# Patient Record
Sex: MALE | Race: WHITE | NOT HISPANIC OR LATINO | Employment: FULL TIME | URBAN - NONMETROPOLITAN AREA
[De-identification: names, ages, dates, MRNs, and addresses within clinical notes are randomized per-mention and may not be internally consistent; named-entity substitution may affect disease eponyms.]

---

## 2018-01-09 ENCOUNTER — OFFICE VISIT CONVERTED (OUTPATIENT)
Dept: FAMILY MEDICINE CLINIC | Age: 43
End: 2018-01-09
Attending: NURSE PRACTITIONER

## 2018-03-19 ENCOUNTER — OFFICE VISIT CONVERTED (OUTPATIENT)
Dept: FAMILY MEDICINE CLINIC | Age: 43
End: 2018-03-19
Attending: NURSE PRACTITIONER

## 2018-06-29 ENCOUNTER — OFFICE VISIT CONVERTED (OUTPATIENT)
Dept: FAMILY MEDICINE CLINIC | Age: 43
End: 2018-06-29
Attending: NURSE PRACTITIONER

## 2018-10-16 ENCOUNTER — OFFICE VISIT CONVERTED (OUTPATIENT)
Dept: FAMILY MEDICINE CLINIC | Age: 43
End: 2018-10-16
Attending: NURSE PRACTITIONER

## 2019-02-12 ENCOUNTER — OFFICE VISIT CONVERTED (OUTPATIENT)
Dept: FAMILY MEDICINE CLINIC | Age: 44
End: 2019-02-12
Attending: NURSE PRACTITIONER

## 2019-02-13 ENCOUNTER — HOSPITAL ENCOUNTER (OUTPATIENT)
Dept: OTHER | Facility: HOSPITAL | Age: 44
Discharge: HOME | End: 2019-02-13

## 2019-02-13 LAB
25(OH)D3 SERPL-MCNC: 17.6 NG/ML (ref 30–100)
ALBUMIN SERPL-MCNC: 4.7 G/DL (ref 3.5–5)
ALBUMIN/GLOB SERPL: 1.7 {RATIO} (ref 1.4–2.6)
ALP SERPL-CCNC: 53 U/L (ref 53–128)
ALT SERPL-CCNC: 41 U/L (ref 10–40)
ANION GAP SERPL CALC-SCNC: 18 MMOL/L (ref 8–19)
AST SERPL-CCNC: 23 U/L (ref 15–50)
BASOPHILS # BLD MANUAL: 0.04 10*3/UL (ref 0–0.2)
BASOPHILS NFR BLD MANUAL: 0.4 % (ref 0–3)
BILIRUB SERPL-MCNC: <0.15 MG/DL (ref 0.2–1.3)
BUN SERPL-MCNC: 18 MG/DL (ref 5–25)
BUN/CREAT SERPL: 18 {RATIO} (ref 6–20)
CALCIUM SERPL-MCNC: 9.6 MG/DL (ref 8.7–10.4)
CHLORIDE SERPL-SCNC: 101 MMOL/L (ref 99–111)
CHOLEST SERPL-MCNC: 227 MG/DL (ref 107–200)
CHOLEST/HDLC SERPL: 3.9 {RATIO} (ref 3–6)
CONV CO2: 29 MMOL/L (ref 22–32)
CONV TOTAL PROTEIN: 7.4 G/DL (ref 6.3–8.2)
CREAT UR-MCNC: 0.98 MG/DL (ref 0.7–1.2)
DEPRECATED RDW RBC AUTO: 47.4 FL
EOSINOPHIL # BLD MANUAL: 0.12 10*3/UL (ref 0–0.7)
EOSINOPHIL NFR BLD MANUAL: 1.3 % (ref 0–7)
ERYTHROCYTE [DISTWIDTH] IN BLOOD BY AUTOMATED COUNT: 13.7 % (ref 11.5–14.5)
GFR SERPLBLD BASED ON 1.73 SQ M-ARVRAT: >60 ML/MIN/{1.73_M2}
GLOBULIN UR ELPH-MCNC: 2.7 G/DL (ref 2–3.5)
GLUCOSE SERPL-MCNC: 90 MG/DL (ref 70–99)
GRANS (ABSOLUTE): 5.3 10*3/UL (ref 2–8)
GRANS: 59.1 % (ref 30–85)
HBA1C MFR BLD: 14.7 G/DL (ref 14–18)
HCT VFR BLD AUTO: 44.7 % (ref 42–52)
HDLC SERPL-MCNC: 58 MG/DL (ref 40–60)
IMM GRANULOCYTES # BLD: 0.03 10*3/UL (ref 0–0.54)
IMM GRANULOCYTES NFR BLD: 0.3 % (ref 0–0.43)
LDLC SERPL CALC-MCNC: 133 MG/DL (ref 70–100)
LYMPHOCYTES # BLD MANUAL: 2.49 10*3/UL (ref 1–5)
LYMPHOCYTES NFR BLD MANUAL: 11.2 % (ref 3–10)
MCH RBC QN AUTO: 30.9 PG (ref 27–31)
MCHC RBC AUTO-ENTMCNC: 32.9 G/DL (ref 33–37)
MCV RBC AUTO: 93.9 FL (ref 80–96)
MONOCYTES # BLD AUTO: 1.01 10*3/UL (ref 0.2–1.2)
OSMOLALITY SERPL CALC.SUM OF ELEC: 297 MOSM/KG (ref 273–304)
PLATELET # BLD AUTO: 299 10*3/UL (ref 130–400)
PMV BLD AUTO: 10.1 FL (ref 7.4–10.4)
POTASSIUM SERPL-SCNC: 4.5 MMOL/L (ref 3.5–5.3)
RBC # BLD AUTO: 4.76 10*6/UL (ref 4.7–6.1)
SODIUM SERPL-SCNC: 143 MMOL/L (ref 135–147)
TRIGL SERPL-MCNC: 180 MG/DL (ref 40–150)
TSH SERPL-ACNC: 2.49 M[IU]/L (ref 0.27–4.2)
VARIANT LYMPHS NFR BLD MANUAL: 27.7 % (ref 20–45)
VIT B12 SERPL-MCNC: 482 PG/ML (ref 211–911)
VLDLC SERPL-MCNC: 36 MG/DL (ref 5–37)
WBC # BLD AUTO: 8.99 10*3/UL (ref 4.8–10.8)

## 2019-02-15 LAB
SHBG SERPL-SCNC: 27.6 NMOL/L (ref 16.5–55.9)
TESTOST SERPL-MCNC: 257 NG/DL (ref 264–916)
TESTOSTERONE, FREE: 12.8 PG/ML (ref 6.8–21.5)

## 2019-08-09 ENCOUNTER — OFFICE VISIT CONVERTED (OUTPATIENT)
Dept: FAMILY MEDICINE CLINIC | Age: 44
End: 2019-08-09
Attending: NURSE PRACTITIONER

## 2019-09-24 ENCOUNTER — OFFICE VISIT CONVERTED (OUTPATIENT)
Dept: FAMILY MEDICINE CLINIC | Age: 44
End: 2019-09-24
Attending: NURSE PRACTITIONER

## 2019-10-18 ENCOUNTER — OFFICE VISIT CONVERTED (OUTPATIENT)
Dept: FAMILY MEDICINE CLINIC | Age: 44
End: 2019-10-18
Attending: NURSE PRACTITIONER

## 2019-10-29 ENCOUNTER — HOSPITAL ENCOUNTER (OUTPATIENT)
Dept: PHYSICAL THERAPY | Facility: CLINIC | Age: 44
Setting detail: RECURRING SERIES
Discharge: HOME | End: 2020-01-06
Attending: NURSE PRACTITIONER

## 2019-12-18 ENCOUNTER — OFFICE VISIT CONVERTED (OUTPATIENT)
Dept: FAMILY MEDICINE CLINIC | Age: 44
End: 2019-12-18
Attending: NURSE PRACTITIONER

## 2020-02-27 ENCOUNTER — HOSPITAL ENCOUNTER (OUTPATIENT)
Dept: OTHER | Facility: HOSPITAL | Age: 45
Discharge: HOME | End: 2020-02-27
Attending: NURSE PRACTITIONER

## 2020-02-27 LAB
ALBUMIN SERPL-MCNC: 4.7 G/DL (ref 3.5–5)
ALBUMIN/GLOB SERPL: 2 {RATIO} (ref 1.4–2.6)
ALP SERPL-CCNC: 55 U/L (ref 53–128)
ALT SERPL-CCNC: 39 U/L (ref 10–40)
ANION GAP SERPL CALC-SCNC: 20 MMOL/L (ref 8–19)
AST SERPL-CCNC: 21 U/L (ref 15–50)
BASOPHILS # BLD MANUAL: 0.02 10*3/UL (ref 0–0.2)
BASOPHILS NFR BLD MANUAL: 0.3 % (ref 0–3)
BILIRUB SERPL-MCNC: <0.15 MG/DL (ref 0.2–1.3)
BUN SERPL-MCNC: 15 MG/DL (ref 5–25)
BUN/CREAT SERPL: 15 {RATIO} (ref 6–20)
CALCIUM SERPL-MCNC: 9.1 MG/DL (ref 8.7–10.4)
CHLORIDE SERPL-SCNC: 104 MMOL/L (ref 99–111)
CHOLEST SERPL-MCNC: 201 MG/DL (ref 107–200)
CHOLEST/HDLC SERPL: 4 {RATIO} (ref 3–6)
CONV CO2: 21 MMOL/L (ref 22–32)
CONV TOTAL PROTEIN: 7 G/DL (ref 6.3–8.2)
CREAT UR-MCNC: 1.02 MG/DL (ref 0.7–1.2)
DEPRECATED RDW RBC AUTO: 45.6 FL
EOSINOPHIL # BLD MANUAL: 0.09 10*3/UL (ref 0–0.7)
EOSINOPHIL NFR BLD MANUAL: 1.4 % (ref 0–7)
ERYTHROCYTE [DISTWIDTH] IN BLOOD BY AUTOMATED COUNT: 13.5 % (ref 11.5–14.5)
GFR SERPLBLD BASED ON 1.73 SQ M-ARVRAT: >60 ML/MIN/{1.73_M2}
GLOBULIN UR ELPH-MCNC: 2.3 G/DL (ref 2–3.5)
GLUCOSE SERPL-MCNC: 85 MG/DL (ref 70–99)
GRANS (ABSOLUTE): 3.08 10*3/UL (ref 2–8)
GRANS: 49 % (ref 30–85)
HBA1C MFR BLD: 15.1 G/DL (ref 14–18)
HCT VFR BLD AUTO: 45 % (ref 42–52)
HDLC SERPL-MCNC: 50 MG/DL (ref 40–60)
IMM GRANULOCYTES # BLD: 0.01 10*3/UL (ref 0–0.54)
IMM GRANULOCYTES NFR BLD: 0.2 % (ref 0–0.43)
LDLC SERPL CALC-MCNC: 118 MG/DL (ref 70–100)
LYMPHOCYTES # BLD MANUAL: 2.26 10*3/UL (ref 1–5)
LYMPHOCYTES NFR BLD MANUAL: 13.2 % (ref 3–10)
MCH RBC QN AUTO: 30.7 PG (ref 27–31)
MCHC RBC AUTO-ENTMCNC: 33.6 G/DL (ref 33–37)
MCV RBC AUTO: 91.5 FL (ref 80–96)
MONOCYTES # BLD AUTO: 0.83 10*3/UL (ref 0.2–1.2)
OSMOLALITY SERPL CALC.SUM OF ELEC: 292 MOSM/KG (ref 273–304)
PLATELET # BLD AUTO: 322 10*3/UL (ref 130–400)
PMV BLD AUTO: 10 FL (ref 7.4–10.4)
POTASSIUM SERPL-SCNC: 3.9 MMOL/L (ref 3.5–5.3)
RBC # BLD AUTO: 4.92 10*6/UL (ref 4.7–6.1)
SODIUM SERPL-SCNC: 141 MMOL/L (ref 135–147)
TRIGL SERPL-MCNC: 506 MG/DL (ref 40–150)
TSH SERPL-ACNC: 2.27 M[IU]/L (ref 0.27–4.2)
VARIANT LYMPHS NFR BLD MANUAL: 35.9 % (ref 20–45)
WBC # BLD AUTO: 6.29 10*3/UL (ref 4.8–10.8)

## 2020-03-03 ENCOUNTER — OFFICE VISIT CONVERTED (OUTPATIENT)
Dept: FAMILY MEDICINE CLINIC | Age: 45
End: 2020-03-03
Attending: NURSE PRACTITIONER

## 2020-10-19 ENCOUNTER — OFFICE VISIT CONVERTED (OUTPATIENT)
Dept: FAMILY MEDICINE CLINIC | Age: 45
End: 2020-10-19
Attending: NURSE PRACTITIONER

## 2020-10-22 ENCOUNTER — HOSPITAL ENCOUNTER (OUTPATIENT)
Dept: OTHER | Facility: HOSPITAL | Age: 45
Discharge: HOME | End: 2020-10-22
Attending: NURSE PRACTITIONER

## 2020-12-17 ENCOUNTER — HOSPITAL ENCOUNTER (OUTPATIENT)
Dept: PHYSICAL THERAPY | Facility: CLINIC | Age: 45
Setting detail: RECURRING SERIES
Discharge: HOME | End: 2021-02-19
Attending: NURSE PRACTITIONER

## 2020-12-21 ENCOUNTER — HOSPITAL ENCOUNTER (OUTPATIENT)
Dept: OTHER | Facility: HOSPITAL | Age: 45
Discharge: HOME | End: 2020-12-21
Attending: NURSE PRACTITIONER

## 2020-12-21 LAB
ALBUMIN SERPL-MCNC: 4.7 G/DL (ref 3.5–5)
ALBUMIN/GLOB SERPL: 1.9 {RATIO} (ref 1.4–2.6)
ALP SERPL-CCNC: 45 U/L (ref 53–128)
ALT SERPL-CCNC: 35 U/L (ref 10–40)
ANION GAP SERPL CALC-SCNC: 14 MMOL/L (ref 8–19)
AST SERPL-CCNC: 26 U/L (ref 15–50)
BILIRUB SERPL-MCNC: 0.18 MG/DL (ref 0.2–1.3)
BUN SERPL-MCNC: 16 MG/DL (ref 5–25)
BUN/CREAT SERPL: 13 {RATIO} (ref 6–20)
CALCIUM SERPL-MCNC: 9.4 MG/DL (ref 8.7–10.4)
CHLORIDE SERPL-SCNC: 106 MMOL/L (ref 99–111)
CHOLEST SERPL-MCNC: 189 MG/DL (ref 107–200)
CHOLEST/HDLC SERPL: 3.3 {RATIO} (ref 3–6)
CONV CO2: 25 MMOL/L (ref 22–32)
CONV TOTAL PROTEIN: 7.2 G/DL (ref 6.3–8.2)
CREAT UR-MCNC: 1.27 MG/DL (ref 0.7–1.2)
GFR SERPLBLD BASED ON 1.73 SQ M-ARVRAT: >60 ML/MIN/{1.73_M2}
GLOBULIN UR ELPH-MCNC: 2.5 G/DL (ref 2–3.5)
GLUCOSE SERPL-MCNC: 92 MG/DL (ref 70–99)
HDLC SERPL-MCNC: 57 MG/DL (ref 40–60)
LDLC SERPL CALC-MCNC: 100 MG/DL (ref 70–100)
OSMOLALITY SERPL CALC.SUM OF ELEC: 293 MOSM/KG (ref 273–304)
POTASSIUM SERPL-SCNC: 4.2 MMOL/L (ref 3.5–5.3)
SODIUM SERPL-SCNC: 141 MMOL/L (ref 135–147)
TRIGL SERPL-MCNC: 159 MG/DL (ref 40–150)
VLDLC SERPL-MCNC: 32 MG/DL (ref 5–37)

## 2021-02-12 ENCOUNTER — OFFICE VISIT CONVERTED (OUTPATIENT)
Dept: FAMILY MEDICINE CLINIC | Age: 46
End: 2021-02-12
Attending: NURSE PRACTITIONER

## 2021-03-04 ENCOUNTER — HOSPITAL ENCOUNTER (OUTPATIENT)
Dept: OTHER | Facility: HOSPITAL | Age: 46
Discharge: HOME | End: 2021-03-04
Attending: NURSE PRACTITIONER

## 2021-05-18 NOTE — PROGRESS NOTES
Chase Leon 1975     Office/Outpatient Visit    Visit Date:  08:54 am    Provider: Marj Bhatia N.P. (Assistant: Flaquita Romero)    Location: [facility]        Electronically signed by Marj Bhatia N.P. on  2018 12:57:12 AM                             SUBJECTIVE:        CC:     Mr. Leon is a 42 year old White male.  med refills, would like to restart a previous medication         HPI:         Patient presents with anxiety.  Pt currently on lexapro and doing well.          Pt states previous history of alcoholism. He has been in programs and taken meds in the past to help him with his addiction. States having a counselor in the past. He does not feel like he needs that right now. He states drinking more in the summer bc he's bored and it's hot. He knows he needs to quit drinking and that it has become a problem for him.      ROS:     CONSTITUTIONAL:  Negative for chills, fatigue, fever and weight change.      CARDIOVASCULAR:  Negative for chest pain, orthopnea, paroxysmal nocturnal dyspnea and pedal edema.      RESPIRATORY:  Negative for dyspnea and cough.      GASTROINTESTINAL:  Negative for abdominal pain, heartburn, constipation, diarrhea, and stool changes.      PSYCHIATRIC:  Negative for anxiety and depression.          PMH/FMH/SH:     Last Reviewed on 2018 09:23 AM by Marj Bhatia    Past Medical History:             PAST MEDICAL HISTORY     UNREMARKABLE         Surgical History:         Vasectomy: 2006;         Family History:     Father: Suicide      Mother: Healthy     Brother(s): 0 brother(s) total     Sister(s): Healthy; 1 sister(s) total     Son(s): Healthy; 1 son(s) total     Daughter(s): Healthy; 1 daughter(s) total     Paternal Grandfather: ;  CVA     Paternal Grandmother: ;  CVA     Maternal Grandfather: ;  Myocardial Infarction     Maternal Grandmother: Alzheimer's Disease         Tobacco/Alcohol/Supplements:      Last Reviewed on 6/29/2018 09:23 AM by Marj Bhatia    Tobacco: Current Smoker: He currently smokes every day, Cigars.          Alcohol: Frequency: Daily When he drinks, the average quantity of alcohol is 8 drinks.   He typically consumes beer.          Substance Abuse History:     Last Reviewed on 6/29/2018 09:23 AM by Marj Bhatia    None         Mental Health History:     Last Reviewed on 6/29/2018 09:23 AM by Marj Bhatia        Communicable Diseases (eg STDs):     Last Reviewed on 6/29/2018 09:23 AM by Marj Bhatia            Current Problems:     Last Reviewed on 6/29/2018 09:23 AM by Marj Bhatia    Anxiety     Hypertension         Immunizations:     None        Allergies:     Last Reviewed on 6/29/2018 09:23 AM by Marj Bhatia      No Known Drug Allergies.         Current Medications:     Last Reviewed on 6/29/2018 09:23 AM by Marj Bhatia    Lexapro 10mg Tablet 1 tab daily     Lisinopril/Hydrochlorothiazide 20mg/25mg Tablet one a day         OBJECTIVE:        Vitals:         Current: 6/29/2018 8:56:26 AM    Ht:  5 ft, 10 in;  Wt: 201.9 lbs;  BMI: 29.0    T: 97.8 F (oral);  BP: 138/95 mm Hg (left arm, sitting);  P: 85 bpm (left arm (BP Cuff), sitting);  sCr: 0.94 mg/dL;  GFR: 104.64        Exams:     PHYSICAL EXAM:     GENERAL: Vitals recorded well developed, well nourished;  well groomed;  no apparent distress;     EYES: lids and lacrimal system are normal in appearance; extraocular movements intact; conjunctiva and cornea are normal; PERRLA;     NECK:  supple, full ROM; no thyromegaly; no carotid bruits;     RESPIRATORY: normal respiratory rate and pattern with no distress; normal breath sounds with no rales, rhonchi, wheezes or rubs;     CARDIOVASCULAR: normal rate; rhythm is regular;  normal S1; normal S2; no systolic murmur; no cyanosis; no edema;     GASTROINTESTINAL: nontender, nondistended; no hepatosplenomegaly or masses; no bruits;     SKIN:  no significant  rashes or lesions; no suspicious moles;     MUSCULOSKELETAL:  Normal range of motion, strength and tone;     NEUROLOGICAL:  cranial nerves, motor and sensory function, reflexes, gait and coordination are all intact;     PSYCHIATRIC:  appropriate affect and demeanor; normal speech pattern; grossly normal memory;         ASSESSMENT:           300.02   F41.1  Anxiety              DDx:     305.00   F10.10  Alcohol abuse, unspecified              DDx:         ORDERS:         Meds Prescribed:       Antabuse (Disulfiram)  500mg Tablet Take 1 tablet(s) by mouth daily for 14 days  #14 (Fourteen) tablet(s) Refills: 1         Lab Orders:       APPTO  Appointment need  (In-House)                   PLAN:          Anxiety         FOLLOW-UP: Schedule a follow-up visit in 3 months..            Orders:       APPTO  Appointment need  (In-House)            Alcohol abuse, unspecified Pt to call if counseling is needed.           Prescriptions:       Antabuse (Disulfiram)  500mg Tablet Take 1 tablet(s) by mouth daily for 14 days  #14 (Fourteen) tablet(s) Refills: 1             Patient Recommendations:        For  Anxiety:     Schedule a follow-up visit in 3 months.                APPOINTMENT INFORMATION:        Monday Tuesday Wednesday Thursday Friday Saturday Sunday            Time:___________________AM  PM   Date:_____________________             CHARGE CAPTURE:           Primary Diagnosis:     300.02 Anxiety            F41.1    Generalized anxiety disorder              Orders:          67710   Office/outpatient visit; established patient, level 3  (In-House)             APPTO   Appointment need  (In-House)           305.00 Alcohol abuse, unspecified            F10.10    Alcohol abuse, uncomplicated

## 2021-05-18 NOTE — PROGRESS NOTES
Chase Leon  1975     Office/Outpatient Visit    Visit Date: Wed, Dec 18, 2019 08:35 am    Provider: Sammy Denton N.P. (Assistant: Christine Espinal MA)    Location: [facility]        Electronically signed by Sammy Denton N.P. on  12/18/2019 12:36:08 PM                             Subjective:        CC: Mr. Leon is a 44 year old White male.  This is a follow-up visit.  check up         HPI:           Mr. Leon presents with essential (primary) hypertension.  This was first diagnosed over 2 yrs ago years ago.  He is not using any nonpharmacologic treatment modalities.  His current cardiac medication regimen includes a diuretic and an ACE inhibitor.  Compliance with treatment has been good; he takes his medication as directed and follows up as directed.  He is tolerating the medication well without side effects.  Review of his blood pressure log reveals systolics in the 140-160 range and diastolics in the 80-90 range.  BP running too high at home, occasional normal BP     ROS:     CONSTITUTIONAL:  Negative for chills, fatigue and fever.      CARDIOVASCULAR:  Negative for chest pain, orthopnea, paroxysmal nocturnal dyspnea and pedal edema.      RESPIRATORY:  Negative for dyspnea and cough.      GASTROINTESTINAL:  Negative for abdominal pain, heartburn, constipation, diarrhea, and stool changes.      MUSCULOSKELETAL:  Positive for Low back pain , improving with PT, taking muscle relaxer and does not seem to do anything, will change Baclofen to Robaxin.      PSYCHIATRIC:  Negative for anxiety and depression.          Past Medical History / Family History / Social History:         Last Reviewed on 12/18/2019 12:33 PM by Sammy Denton    Past Medical History:             PAST MEDICAL HISTORY     UNREMARKABLE         Surgical History:         Vasectomy: 2006;         Family History:     Father: Suicide 2012     Mother: Healthy     Brother(s): 0 brother(s) total     Sister(s):  Healthy; 1 sister(s) total     Son(s): Healthy; 1 son(s) total     Daughter(s): Healthy; 1 daughter(s) total     Paternal Grandfather: ;  CVA     Paternal Grandmother: ;  CVA     Maternal Grandfather: ;  Myocardial Infarction     Maternal Grandmother: Alzheimer's Disease         Social History:     Occupation: [employer]     Marital Status:      Children: 2 children         Tobacco/Alcohol/Supplements:     Last Reviewed on 2019 12:33 PM by Sammy Denton    Tobacco: Current Smoker: He currently smokes every day, Cigars.          Alcohol: Frequency: Daily When he drinks, the average quantity of alcohol is 8 drinks.   He typically consumes beer.          Current Problems:     Last Reviewed on 2019 12:33 PM by Sammy Denton    Essential (primary) hypertension    Generalized anxiety disorder    Vitamin D deficiency, unspecified    Encounter for screening for other disorder    Low back pain        Immunizations:     None        Allergies:     Last Reviewed on 2019 12:33 PM by Sammy Denton    No Known Allergies.        Current Medications:     Last Reviewed on 2019 12:33 PM by aSmmy Denton    Lisinopril/Hydrochlorothiazide 20mg/25mg Tablet [one a day]    Lexapro 10mg Tablet [1 tab daily]    cholecalciferol (vitamin D3) 5,000 unit oral capsule [1 capsule every day]    Meloxicam 15 mg oral tablet [1 tab po daily]        Objective:        Vitals:         Current: 2019 8:39:59 AM    Ht:  5 ft, 10 in;  Wt: 223.8 lbs;  BMI: 32.1T: 98.4 F (oral);  BP: 138/105 mm Hg (left arm, sitting);  P: 82 bpm (left arm (BP Cuff), sitting);  sCr: 0.98 mg/dL;  GFR: 102.80        Exams:     PHYSICAL EXAM:     GENERAL: Vitals recorded well developed, well nourished;  well groomed;  no apparent distress;     NECK: thyroid is non-palpable; carotid exam is normal with good upstroke and no bruits;     RESPIRATORY: normal respiratory rate and pattern with no distress;  normal breath sounds with no rales, rhonchi, wheezes or rubs;     CARDIOVASCULAR: normal rate; rhythm is regular;  normal S1; normal S2; no systolic murmur; no cyanosis; no edema;     GASTROINTESTINAL: nontender, nondistended; no hepatosplenomegaly or masses; no bruits;     MUSCULOSKELETAL: Low back tenderness with FROM;     NEUROLOGICAL:  cranial nerves, motor and sensory function, reflexes, gait and coordination are all intact;     PSYCHIATRIC:  appropriate affect and demeanor; normal speech pattern; grossly normal memory;         Assessment:         I10   Essential (primary) hypertension       M54.5   Low back pain       F17.200   Nicotine dependence, unspecified, uncomplicated           ORDERS:         Meds Prescribed:       [Refilled] lisinopril-hydrochlorothiazide 20-25 mg oral tablet [one a day], #90 (ninety) tablets, Refills: 0 (zero)       [New Rx] lisinopril 30 mg oral tablet [take 1 tablet (30 mg) by oral route once daily], #90 (ninety) tablets, Refills: 0 (zero)       [New Rx] Robaxin-750 750 mg oral tablet [take 1 tablet (750 mg) by oral route 3 times per day], #90 (ninety) tablets, Refills: 0 (zero)         Lab Orders:       APPTO  Appointment need  (In-House)                      Plan:         Essential (primary) hypertension        FOLLOW-UP: Schedule a follow-up visit in 3 months.:.:for PE and Labs           Prescriptions:       [Refilled] lisinopril-hydrochlorothiazide 20-25 mg oral tablet [one a day], #90 (ninety) tablets, Refills: 0 (zero)       [New Rx] lisinopril 30 mg oral tablet [take 1 tablet (30 mg) by oral route once daily], #90 (ninety) tablets, Refills: 0 (zero)           Orders:       APPTO  Appointment need  (In-House)              Low back painContinue PT. dmt          Prescriptions:       [New Rx] Robaxin-750 750 mg oral tablet [take 1 tablet (750 mg) by oral route 3 times per day], #90 (ninety) tablets, Refills: 0 (zero)         Nicotine dependence, unspecified,  uncomplicatedDiscussed not smoking, only smokes cigars when he drinks and only drinks when he smokes, but usually drinks daily to every other day, Discussed limiting use of alcohol.     MIPS Smoking cessation encouraged. Counseling for less than 3 minutes.              Patient Recommendations:        For  Essential (primary) hypertension:    Schedule a follow-up visit in 3 months.                APPOINTMENT INFORMATION:        Monday Tuesday Wednesday Thursday Friday Saturday Sunday            Time:___________________AM  PM   Date:_____________________             Charge Capture:         Primary Diagnosis:     I10  Essential (primary) hypertension           Orders:      [id number]  Office/outpatient visit; established patient, level 3  (In-House)            APPTO  Appointment need  (In-House)              M54.5  Low back pain     F17.200  Nicotine dependence, unspecified, uncomplicated

## 2021-05-18 NOTE — PROGRESS NOTES
Chase Leon  1975     Office/Outpatient Visit    Visit Date: Mon, Oct 19, 2020 08:49 am    Provider: Sammy Denton N.P. (Assistant: Winnie Reed MA)    Location: [facility]        Electronically signed by Sammy Denton N.P. on  10/19/2020 09:43:09 AM                             Subjective:        CC: Mr. Leon is a 44 year old White male.  Follow up visit on anxiety. Lower back pain.          HPI:           PHQ-9 Depression Screening: Completed form scanned and in chart; Total Score 5       Here for follow up on low back pain, has continued to hurt off and on for over last year. No recent falls or injuries, had xrays at Chiro office and did have some improvement with their help but depending on what he is doing back pain keeps returning.     ROS:     CONSTITUTIONAL:  Negative for chills, fatigue, fever, and weight change.      CARDIOVASCULAR:  Positive for Hx High chol. was taking Fenofibrate, took x 3 mo then ran out of med and never got any more and HTN, BP at home normally 130-90s taking meds as directed.      RESPIRATORY:  Negative for recent cough, dyspnea and frequent wheezing.      GASTROINTESTINAL:  Negative for abdominal pain, constipation, diarrhea, nausea and vomiting.      GENITOURINARY:  Negative for dysuria and hematuria.      MUSCULOSKELETAL:  Positive for back pain ( recurrent ).      PSYCHIATRIC:  Negative for anxiety, depression, and sleep disturbances.          Past Medical History / Family History / Social History:         Last Reviewed on 10/19/2020 09:15 AM by Sammy Denton    Past Medical History:             PAST MEDICAL HISTORY     UNREMARKABLE         Surgical History:         Vasectomy: 2006;         Family History:     Father: Suicide 2012     Mother: Healthy     Brother(s): 0 brother(s) total     Sister(s): Healthy; 1 sister(s) total     Son(s): Healthy; 1 son(s) total     Daughter(s): Healthy; 1 daughter(s) total     Paternal Grandfather:  ;  CVA     Paternal Grandmother: ;  CVA     Maternal Grandfather: ;  Myocardial Infarction     Maternal Grandmother: Alzheimer's Disease         Social History:     Occupation: [employer]     Marital Status:      Children: 2 children         Tobacco/Alcohol/Supplements:     Last Reviewed on 10/19/2020 09:15 AM by Sammy Denton    Tobacco: Current Smoker: He currently smokes every day, Cigars.          Alcohol: Frequency: Daily When he drinks, the average quantity of alcohol is 8 drinks.   He typically consumes beer.          Substance Abuse History:     Last Reviewed on 10/19/2020 09:15 AM by Sammy Denton    None         Mental Health History:     Last Reviewed on 10/19/2020 09:15 AM by Sammy Denton        Communicable Diseases (eg STDs):     Last Reviewed on 10/19/2020 09:15 AM by Sammy Denton        Current Problems:     Last Reviewed on 10/19/2020 09:15 AM by Sammy Denton    Essential (primary) hypertension    Generalized anxiety disorder    Vitamin D deficiency, unspecified    Encounter for screening for other disorder    Low back pain    Nicotine dependence, unspecified, uncomplicated    Encounter for general adult medical examination without abnormal findings    Encounter for screening for depression    Mixed hyperlipidemia        Immunizations:     None        Allergies:     Last Reviewed on 10/19/2020 09:15 AM by Sammy Denton    No Known Allergies.        Current Medications:     Last Reviewed on 10/19/2020 09:15 AM by Sammy Denton    lisinopriL-hydrochlorothiazide 20-25 mg oral tablet [TAKE 1 TABLET DAILY]    escitalopram oxalate 10 mg oral tablet [TAKE 1 TABLET DAILY]    lisinopriL 30 mg oral tablet [TAKE 1 TABLET ONCE DAILY]    fenofibrate 120 mg oral tablet [take 1 tablet (120 mg) by oral route once daily]    Meloxicam 15 mg oral tablet [1 tab po daily]        Objective:        Vitals:         Current: 10/19/2020 8:53:56 AM    Ht:  5  ft, 10 in;  Wt: 234 lbs;  BMI: 33.6T: 97.4 F (temporal);  BP: 130/97 mm Hg (left arm, sitting);  P: 87 bpm (left arm (BP Cuff), sitting);  sCr: 1.02 mg/dL;  GFR: 100.65        Repeat:     9:41:36 AM  BP:   130/90mm Hg (left arm, sitting)     Exams:     PHYSICAL EXAM:     GENERAL: Vitals recorded well developed, well nourished;  well groomed;  no apparent distress;     RESPIRATORY: normal respiratory rate and pattern with no distress; normal breath sounds with no rales, rhonchi, wheezes or rubs;     CARDIOVASCULAR: normal rate; rhythm is regular;  no systolic murmur; no edema;     GASTROINTESTINAL: nontender, nondistended; no hepatosplenomegaly or masses; no bruits;     MUSCULOSKELETAL: Low back tenderness , FROM;     NEUROLOGIC: GROSSLY INTACT     PSYCHIATRIC:  appropriate affect and demeanor; normal speech pattern; grossly normal memory;         Assessment:         Z13.31   Encounter for screening for depression       E78.2   Mixed hyperlipidemia       M54.5   Low back pain       I10   Essential (primary) hypertension           ORDERS:         Meds Prescribed:       [Refilled] Meloxicam 15 mg oral tablet [1 tab po daily], #90 (ninety) tablets, Refills: 1 (one)       [Refilled] lisinopriL-hydrochlorothiazide 20-25 mg oral tablet [TAKE 1 TABLET DAILY], #90 (ninety) tablets, Refills: 1 (one)       [Refilled] lisinopriL 30 mg oral tablet [TAKE 1 TABLET ONCE DAILY], #90 (ninety) tablets, Refills: 1 (one)       [Refilled] fenofibrate 120 mg oral tablet [take 1 tablet (120 mg) by oral route once daily], #90 (ninety) tablets, Refills: 3 (three)       [New Rx] amLODIPine 5 mg oral tablet [1 tab daily], #90 (ninety) tablets, Refills: 1 (one)         Radiology/Test Orders:       [id number]  Radiologic examination, spine, lumbosacral;  minimum of four views  (Send-Out; Stat)              Lab Orders:       [id number]  HTNLP - H CMP AND LIPID: [id number], [id number]  (Send-Out)              Other Orders:         Depression screen negative   (In-House)                      Plan:         Encounter for screening for depression    MIPS PHQ-9 Depression Screening: Completed form scanned and in chart; Total Score 5; Negative Depression Screen           Orders:         Depression screen negative  (In-House)              Mixed hyperlipidemia    LABORATORY:  Labs ordered to be performed today include HTN/Lipid Panel: CMP, Lipid.  Labs to be done 11/19/2020           Prescriptions:       [Refilled] fenofibrate 120 mg oral tablet [take 1 tablet (120 mg) by oral route once daily], #90 (ninety) tablets, Refills: 3 (three)           Orders:       [id number]  HTN - [facility] CMP AND LIPID: [id number], [id number]  (Send-Out)              Low back pain        RADIOLOGY:  I have ordered Lumbar/Sacral Spine X-ray to be done today.      RECOMMENDATIONS given include: Further recommendation to be given after test results are complete.            Prescriptions:       [Refilled] Meloxicam 15 mg oral tablet [1 tab po daily], #90 (ninety) tablets, Refills: 1 (one)           Orders:       [id number]  Radiologic examination, spine, lumbosacral;  minimum of four views  (Send-Out; Stat)              Essential (primary) hypertensionCheck BP bid x 2 weeks, resting, we will call for readings. dmt          Prescriptions:       [Refilled] lisinopriL-hydrochlorothiazide 20-25 mg oral tablet [TAKE 1 TABLET DAILY], #90 (ninety) tablets, Refills: 1 (one)       [Refilled] lisinopriL 30 mg oral tablet [TAKE 1 TABLET ONCE DAILY], #90 (ninety) tablets, Refills: 1 (one)       [New Rx] amLODIPine 5 mg oral tablet [1 tab daily], #90 (ninety) tablets, Refills: 1 (one)             Charge Capture:         Primary Diagnosis:     Z13.31  Encounter for screening for depression           Orders:      [id number]  Office/outpatient visit; established patient, level 4  (In-House)              Depression screen negative  (In-House)              E78.2  Mixed hyperlipidemia     M54.5  Low back pain     I10  Essential (primary)  hypertension

## 2021-05-18 NOTE — PROGRESS NOTES
Chase Leon 1975     Office/Outpatient Visit    Visit Date: Fri, Aug 9, 2019 08:32 am    Provider: Marj Bhatia N.P. (Assistant: Melly Barone MA)    Location: [facility]        Electronically signed by Marj Bhatia N.P. on  08/09/2019 03:25:58 PM                             SUBJECTIVE:        CC:     Mr. Leon is a 43 year old White male.  This is a follow-up visit.  [facility]         HPI:         Mr. Leon presents with screening for depression.          With regard to the anxiety, pt recently joined a gym. States he had previously considered increasing his lexapro. However, he is now wanting to wait and get a routing with exercising then evaluate if the lexapro is needed.  Pt states having a evens living in the apartment beside him that is retired. They would play for hours on the [place] and drink beer when he got home from work. States the evens moved out and it has helped him. Now he is able to use that time for the gym.         Vit D deficiency: Pt states taking 5k daily. He wants to wait for his yearly labs to recheck his level.          PHQ-9 Depression Screening: Completed form scanned and in chart; Total Score 9 Alcohol Consumption Screening: Completed form scanned and in chart; Total Score 8     ROS:     CONSTITUTIONAL:  Negative for chills, fatigue and fever.      CARDIOVASCULAR:  Negative for chest pain, orthopnea, paroxysmal nocturnal dyspnea and pedal edema.      RESPIRATORY:  Negative for dyspnea and cough.      GASTROINTESTINAL:  Negative for abdominal pain, heartburn, constipation, diarrhea, and stool changes.      MUSCULOSKELETAL:  Negative for arthralgias, back pain, and myalgias.      NEUROLOGICAL:  Negative for dizziness, headaches, paresthesias, and weakness.      PSYCHIATRIC:  Positive for anxiety.   Negative for depression.          PM/FMH/SH:     Last Reviewed on 8/09/2019 08:46 AM by Marj Bhatia    Past Medical History:             PAST MEDICAL HISTORY      UNREMARKABLE         Surgical History:         Vasectomy: 2006;         Family History:     Father: Suicide      Mother: Healthy     Brother(s): 0 brother(s) total     Sister(s): Healthy; 1 sister(s) total     Son(s): Healthy; 1 son(s) total     Daughter(s): Healthy; 1 daughter(s) total     Paternal Grandfather: ;  CVA     Paternal Grandmother: ;  CVA     Maternal Grandfather: ;  Myocardial Infarction     Maternal Grandmother: Alzheimer's Disease         Tobacco/Alcohol/Supplements:     Last Reviewed on 2019 08:46 AM by Marj Bhatia    Tobacco: Current Smoker: He currently smokes every day, Cigars.          Alcohol: Frequency: Daily When he drinks, the average quantity of alcohol is 8 drinks.   He typically consumes beer.          Substance Abuse History:     Last Reviewed on 2019 08:46 AM by Marj Bhatia         Mental Health History:     Last Reviewed on 2019 08:46 AM by Marj Bhatia        Communicable Diseases (eg STDs):     Last Reviewed on 2019 08:46 AM by Marj Bhatia            Current Problems:     Last Reviewed on 2019 08:46 AM by Marj Bhatia    Vitamin D deficiency, unspecified     Fatigue     Anxiety     Hypertension     Screening for depression         Immunizations:     None        Allergies:     Last Reviewed on 2019 08:46 AM by Marj Bhatia      No Known Drug Allergies.         Current Medications:     Last Reviewed on 2019 08:46 AM by Marj Bhatia    Lisinopril/Hydrochlorothiazide 20mg/25mg Tablet one a day     Lexapro 10mg Tablet 1 tab daily         OBJECTIVE:        Vitals:         Current: 2019 8:40:18 AM    Ht:  5 ft, 10 in;  Wt: 214.2 lbs;  BMI: 30.7    T: 97.2 F (oral);  BP: 133/90 mm Hg (right arm, sitting);  P: 86 bpm (right arm (BP Cuff), sitting);  sCr: 0.98 mg/dL;  GFR: 101.91        Exams:     PHYSICAL EXAM:     GENERAL: Vitals recorded well developed, well nourished;  well  groomed;  no apparent distress;     EYES: lids and lacrimal system are normal in appearance; extraocular movements intact; conjunctiva and cornea are normal; PERRLA;     NECK:  supple, full ROM; no thyromegaly; no carotid bruits;     RESPIRATORY: normal respiratory rate and pattern with no distress; normal breath sounds with no rales, rhonchi, wheezes or rubs;     CARDIOVASCULAR: normal rate; rhythm is regular;  normal S1; normal S2; no systolic murmur; no cyanosis; no edema;     SKIN:  no significant rashes or lesions; no suspicious moles;     MUSCULOSKELETAL:  Normal range of motion, strength and tone;     NEUROLOGICAL:  cranial nerves, motor and sensory function, reflexes, gait and coordination are all intact;     PSYCHIATRIC:  appropriate affect and demeanor; normal speech pattern; grossly normal memory;         ASSESSMENT:           V79.0   Z13.89  Screening for depression              DDx:     300.02   F41.1  Anxiety              DDx:     268.9   E55.9  Vitamin D deficiency, unspecified              DDx:     V79.0   Z13.89  Screening for depression              DDx:         ORDERS:         Procedures Ordered:       REFER  Referral to Specialist or Other Facility  (Send-Out)                   PLAN:          Anxiety         REFERRALS:  Referral initiated to a psychologist ( [facility]; for evaluation of anxiety ).            Prescriptions: continue me           Orders:       REFER  Referral to Specialist or Other Facility  (Send-Out)            Vitamin D deficiency, unspecified           Prescriptions: continue med             CHARGE CAPTURE:           Primary Diagnosis:     V79.0 Screening for depression            Z13.89    Encounter for screening for other disorder              Orders:          81348   Office/outpatient visit; established patient, level 4  (In-House)           300.02 Anxiety            F41.1    Generalized anxiety disorder    268.9 Vitamin D deficiency, unspecified             E55.9    Vitamin D deficiency, unspecified    V79.0 Screening for depression            Z13.89    Encounter for screening for other disorder

## 2021-05-18 NOTE — PROGRESS NOTES
Chase Leon  1975     Office/Outpatient Visit    Visit Date: Tue, Mar 3, 2020 08:45 am    Provider: Sammy Denton N.P. (Assistant: Coni Elaine LPN)    Location: [facility]        Electronically signed by Sammy Denton N.P. on  03/03/2020 12:56:14 PM                             Subjective:        CC: Mr. Leon is a 44 year old White male.  Physical         HPI:           Encounter for general adult medical examination without abnormal findings noted.  His last physical exam was 1 year ago.  A hearing test was done <1 year ago and results were normal.   He's had vision screening done <1 year ago and this was normal.  Depression screen is performed and is negative.  He is not current with his influenza immunization.      Smoking status: He smokes cigars.            PHQ-9 Depression Screening: Completed form scanned and in chart; Total Score 8     ROS:     CONSTITUTIONAL:  Negative for chills, fatigue and fever.      E/N/T:  Negative for hearing problems, E/N/T pain, congestion, rhinorrhea, epistaxis, hoarseness, and dental problems.      CARDIOVASCULAR:  Positive for Hx of HTN, normally 130-140 over 80-90 at home.   Negative for chest pain, orthopnea, paroxysmal nocturnal dyspnea or pedal edema.      RESPIRATORY:  Negative for dyspnea and cough.      GASTROINTESTINAL:  Negative for abdominal pain, heartburn, constipation, diarrhea, and stool changes.      NEUROLOGICAL:  Negative for dizziness, headaches, paresthesias, and weakness.      PSYCHIATRIC:  Negative for anxiety and depression.          Past Medical History / Family History / Social History:         Last Reviewed on 12/18/2019 12:33 PM by Sammy Denton    Past Medical History:             PAST MEDICAL HISTORY     UNREMARKABLE         Surgical History:         Vasectomy: 2006;         Family History:     Father: Suicide 2012     Mother: Healthy     Brother(s): 0 brother(s) total     Sister(s): Healthy; 1 sister(s)  total     Son(s): Healthy; 1 son(s) total     Daughter(s): Healthy; 1 daughter(s) total     Paternal Grandfather: ;  CVA     Paternal Grandmother: ;  CVA     Maternal Grandfather: ;  Myocardial Infarction     Maternal Grandmother: Alzheimer's Disease         Social History:     Occupation: [employer]     Marital Status:      Children: 2 children         Tobacco/Alcohol/Supplements:     Last Reviewed on 2019 12:33 PM by Sammy Denton    Tobacco: Current Smoker: He currently smokes every day, Cigars.          Alcohol: Frequency: Daily When he drinks, the average quantity of alcohol is 8 drinks.   He typically consumes beer.          Substance Abuse History:     Last Reviewed on 2019 12:33 PM by Sammy Denton    None         Mental Health History:     Last Reviewed on 2019 12:33 PM by Sammy Denton        Communicable Diseases (eg STDs):     Last Reviewed on 2019 12:33 PM by Sammy Denton        Current Problems:     Last Reviewed on 2019 12:33 PM by Sammy Denton    Essential (primary) hypertension    Hypertension    Anxiety    Generalized anxiety disorder    Vitamin D deficiency, unspecified    Vitamin D deficiency, unspecified    Screening for depression    Encounter for screening for other disorder    Low back pain    Low back pain    Nicotine dependence, unspecified, uncomplicated    Encounter for general adult medical examination without abnormal findings        Immunizations:     None        Allergies:     Last Reviewed on 2019 12:33 PM by Sammy Denton    No Known Allergies.        Current Medications:     Last Reviewed on 2019 12:33 PM by Sammy Denton    lisinopril-hydrochlorothiazide 20-25 mg oral tablet [one a day]    Lexapro 10mg Tablet [1 tab daily]    cholecalciferol (vitamin D3) 5,000 unit oral capsule [1 capsule every day]    Meloxicam 15 mg oral tablet [1 tab po daily]    lisinopril 30 mg oral  tablet [take 1 tablet (30 mg) by oral route once daily]    Robaxin-750 750 mg oral tablet [take 1 tablet (750 mg) by oral route 3 times per day]        Objective:        Vitals:         Current: 3/3/2020 8:51:15 AM    Ht:  5 ft, 10 in;  Wt: 224.6 lbs;  BMI: 32.2T: 98.4 F (oral);  BP: 132/93 mm Hg (left arm, sitting);  P: 83 bpm (left arm (BP Cuff), sitting);  sCr: 1.02 mg/dL;  GFR: 98.92        Repeat:     9:10:36 AM  BP:   130/87mm Hg    Exams:     PHYSICAL EXAM:     GENERAL: Vitals recorded well developed, well nourished;  well groomed;  no apparent distress;     E/N/T:  normal EACs, TMs, nasal/oral mucosa, teeth, gingiva, and oropharynx;     RESPIRATORY: normal respiratory rate and pattern with no distress; normal breath sounds with no rales, rhonchi, wheezes or rubs;     CARDIOVASCULAR: normal rate; rhythm is regular;  normal S1; normal S2; no systolic murmur; no cyanosis; no edema;     GASTROINTESTINAL: nontender, nondistended; no hepatosplenomegaly or masses; no bruits;     MUSCULOSKELETAL:  Normal range of motion, strength and tone;     NEUROLOGICAL:  cranial nerves, motor and sensory function, reflexes, gait and coordination are all intact;     PSYCHIATRIC:  appropriate affect and demeanor; normal speech pattern; grossly normal memory;         Assessment:         Z00.00   Encounter for general adult medical examination without abnormal findings       Z13.31   Encounter for screening for depression       I10   Essential (primary) hypertension       F17.200   Nicotine dependence, unspecified, uncomplicated       E78.2   Mixed hyperlipidemia           ORDERS:         Meds Prescribed:       [New Rx] fenofibrate 120 mg oral tablet [take 1 tablet (120 mg) by oral route once daily], #90 (ninety) tablets, Refills: 3 (three)       [Refilled] Meloxicam 15 mg oral tablet [1 tab po daily], #90 (ninety) tablets, Refills: 0 (zero)       [Refilled] Lexapro 10 mg oral tablet [1 tab daily], #90 (ninety) tablets, Refills: 0  (zero)       [Refilled] lisinopril-hydrochlorothiazide 20-25 mg oral tablet [one a day], #90 (ninety) tablets, Refills: 0 (zero)       [Refilled] lisinopril 30 mg oral tablet [take 1 tablet (30 mg) by oral route once daily], #90 (ninety) tablets, Refills: 0 (zero)         Lab Orders:       APPTO  Appointment need  (In-House)              Other Orders:       1101F  Pt screen for fall risk; document no falls in past year or only 1 fall w/o injury in past year (NATA)  (In-House)              Depression screen positive and follow up plan documented  (In-House)                      Plan:         Encounter for general adult medical examination without abnormal findings    MIPS Has had no falls or only one fall without injury in the past year Vaccines Flu and Pneumonia updated in Shot record  Smoking cessation encouraged. Counseling for less than 3 minutes.      FOLLOW-UP: Schedule follow-up appointments on a p.r.n. basis.:in 1 year:.            Orders:       1101F  Pt screen for fall risk; document no falls in past year or only 1 fall w/o injury in past year (NATA)  (In-House)            APPTO  Appointment need  (In-House)              Encounter for screening for depression    MIPS PHQ-9 Depression Screening: Completed form scanned and in chart; Total Score 8 Positive Depression Screen: Stable on medications. No suicidal ideation.            Prescriptions:       [Refilled] Lexapro 10 mg oral tablet [1 tab daily], #90 (ninety) tablets, Refills: 0 (zero)           Orders:         Depression screen positive and follow up plan documented  (In-House)              Essential (primary) hypertension          Prescriptions:       [Refilled] lisinopril-hydrochlorothiazide 20-25 mg oral tablet [one a day], #90 (ninety) tablets, Refills: 0 (zero)       [Refilled] lisinopril 30 mg oral tablet [take 1 tablet (30 mg) by oral route once daily], #90 (ninety) tablets, Refills: 0 (zero)         Mixed hyperlipidemia           Prescriptions:       [New Rx] fenofibrate 120 mg oral tablet [take 1 tablet (120 mg) by oral route once daily], #90 (ninety) tablets, Refills: 3 (three)             Other Prescriptions:       [Refilled] Meloxicam 15 mg oral tablet [1 tab po daily], #90 (ninety) tablets, Refills: 0 (zero)         Patient Recommendations:        For  Encounter for general adult medical examination without abnormal findings:    Schedule follow-up appointments as needed.                APPOINTMENT INFORMATION:        Monday Tuesday Wednesday Thursday Friday Saturday Sunday            Time:___________________AM  PM   Date:_____________________             Charge Capture:         Primary Diagnosis:     Z00.00  Encounter for general adult medical examination without abnormal findings           Orders:      38991  Preventive medicine, established patient, age 40-64 years  (In-House)            1101F  Pt screen for fall risk; document no falls in past year or only 1 fall w/o injury in past year (NATA)  (In-House)            APPTO  Appointment need  (In-House)              Z13.31  Encounter for screening for depression           Orders:        Depression screen positive and follow up plan documented  (In-House)              I10  Essential (primary) hypertension     F17.200  Nicotine dependence, unspecified, uncomplicated     E78.2  Mixed hyperlipidemia

## 2021-05-18 NOTE — PROGRESS NOTES
Chase Leon 1975     Office/Outpatient Visit    Visit Date: Tue, Feb 12, 2019 08:44 am    Provider: Marj Bhatia N.P. (Assistant: Melina Perkins MA)    Location: [facility]        Electronically signed by Marj Bhatia N.P. on  02/12/2019 10:20:05 AM                             SUBJECTIVE:        CC:     Mr. Leon is a 43 year old White male.  Patient presents today to discuss medications, has complaints of increased anxiety. Needs to discuss FMLA         HPI:         Patient presents with anxiety.  Pt states stress with special needs child, his child needs surgery. States overall general stress. States leaving work d/t stress. He is wanting to have FMLA filled out for his previous missed appointments. He is also needing it for his son's intestinal surgery. States using leave for his kids and his own anxiety.          Hypertension details; states doing well on meds. Here for f/u and labs.          Additionally, he presents with history of fatigue.  other details: States daytime fatigue with no identified cause. States no desire for activities with friends. Not much of a sexual desire. Denies ED..          Vit D: Not currently supplementing. Checking labs today.      ROS:     CONSTITUTIONAL:  Positive for fatigue.   Negative for fever or weight change.      CARDIOVASCULAR:  Negative for chest pain, orthopnea, paroxysmal nocturnal dyspnea and pedal edema.      RESPIRATORY:  Negative for dyspnea and cough.      GASTROINTESTINAL:  Negative for abdominal pain, heartburn, constipation, diarrhea, and stool changes.      MUSCULOSKELETAL:  Negative for arthralgias, back pain, and myalgias.      NEUROLOGICAL:  Negative for dizziness, headaches, paresthesias, and weakness.      PSYCHIATRIC:  Negative for anxiety and depression.          PMH/FMH/SH:     Last Reviewed on 2/12/2019 09:01 AM by Marj Bhatia    Past Medical History:             PAST MEDICAL HISTORY     UNREMARKABLE          Surgical History:         Vasectomy: 2006;         Family History:     Father: Suicide      Mother: Healthy     Brother(s): 0 brother(s) total     Sister(s): Healthy; 1 sister(s) total     Son(s): Healthy; 1 son(s) total     Daughter(s): Healthy; 1 daughter(s) total     Paternal Grandfather: ;  CVA     Paternal Grandmother: ;  CVA     Maternal Grandfather: ;  Myocardial Infarction     Maternal Grandmother: Alzheimer's Disease         Tobacco/Alcohol/Supplements:     Last Reviewed on 2019 09:01 AM by Marj Bhatia    Tobacco: Current Smoker: He currently smokes every day, Cigars.          Alcohol: Frequency: Daily When he drinks, the average quantity of alcohol is 8 drinks.   He typically consumes beer.          Substance Abuse History:     Last Reviewed on 2019 09:01 AM by Marj Bhatia    None         Mental Health History:     Last Reviewed on 2019 09:01 AM by Marj Bhatia        Communicable Diseases (eg STDs):     Last Reviewed on 2019 09:01 AM by Marj Bhatia            Current Problems:     Last Reviewed on 2019 09:01 AM by Marj Bhatia    Anxiety     Hypertension     Screening for depression         Immunizations:     None        Allergies:     Last Reviewed on 2019 09:01 AM by Marj Bhatia      No Known Drug Allergies.         Current Medications:     Last Reviewed on 2019 09:01 AM by Marj Bhatia    Lexapro 10mg Tablet 1 tab daily     Lisinopril/Hydrochlorothiazide 20mg/25mg Tablet one a day         OBJECTIVE:        Vitals:         Current: 2019 8:49:23 AM    Ht:  5 ft, 10 in;  Wt: 213.6 lbs;  BMI: 30.6    T: 98 F (oral);  BP: 134/86 mm Hg (left arm, sitting);  P: 91 bpm (left arm (BP Cuff), sitting);  sCr: 0.94 mg/dL;  GFR: 106.12        Exams:     PHYSICAL EXAM:     GENERAL: Vitals recorded well developed, well nourished;  well groomed;  no apparent distress;     EYES: lids and lacrimal system are normal  in appearance; extraocular movements intact; conjunctiva and cornea are normal; PERRLA;     NECK:  supple, full ROM; no thyromegaly; no carotid bruits;     RESPIRATORY: normal respiratory rate and pattern with no distress; normal breath sounds with no rales, rhonchi, wheezes or rubs;     CARDIOVASCULAR: normal rate; rhythm is regular;  normal S1; normal S2; no systolic murmur; no cyanosis; no edema;     GASTROINTESTINAL: nontender, nondistended; no hepatosplenomegaly or masses; no bruits;     SKIN:  no significant rashes or lesions; no suspicious moles;     MUSCULOSKELETAL:  Normal range of motion, strength and tone;     NEUROLOGICAL:  cranial nerves, motor and sensory function, reflexes, gait and coordination are all intact;     PSYCHIATRIC:  appropriate affect and demeanor; normal speech pattern; grossly normal memory;         ASSESSMENT:           300.02   F41.1  Anxiety              DDx:     401.1   I10  Hypertension              DDx:     780.79   R53.83  Fatigue              DDx:     268.9   E55.9  Vitamin D deficiency, unspecified              DDx:         ORDERS:         Meds Prescribed:       Buspirone HCl 15mg Tablet one to two tablets twice daily as needed  #60 (Sixty) tablet(s) Refills: 1         Lab Orders:       96364  BDCBC - [facility] CBC with 3 part diff  (Send-Out)         33987  COMP - [facility] Comp. Metabolic Panel  (Send-Out)         30502  LPDP - [facility] Lipid Panel  (Send-Out)         57601  TSH - [facility] TSH  (Send-Out)         FUTURE  Future order to be done at patients convenience  (Send-Out)         09007  TFTSG - [facility] Testosterone, free and Total weakly bound  (Send-Out)         89839  VITD - [facility] Vitamin D, 25 Hydroxy  (Send-Out)         57749  VB12 - [facility] Vitamin B12  (Send-Out)         APPTO  Appointment need  (In-House)                   PLAN:          Anxiety Pt to bring [place] papers to back date and continue.         FOLLOW-UP: Schedule a follow-up visit in 3 months..            Prescriptions:        Buspirone HCl 15mg Tablet one to two tablets twice daily as needed  #60 (Sixty) tablet(s) Refills: 1           Orders:       APPTO  Appointment need  (In-House)            Hypertension     LABORATORY:  Labs ordered to be performed today include CBC, Comprehensive metabolic panel, lipid panel, and TSH.            Orders:       80420  BDCBC - [facility] CBC with 3 part diff  (Send-Out)         72791  COMP - [facility] Comp. Metabolic Panel  (Send-Out)         66760  LPDP - [facility] Lipid Panel  (Send-Out)         83436  TSH - [facility] TSH  (Send-Out)            Fatigue     LABORATORY:  Labs ordered to be performed today include B12.      FOLLOW-UP TESTING #1: FOLLOW-UP LABORATORY:  Labs to be scheduled in the future include Testosterone free and total.            Orders:       FUTURE  Future order to be done at patients convenience  (Send-Out)         66517  TFTSG - [facility] Testosterone, free and Total weakly bound  (Send-Out)         80782  VB12 - [facility] Vitamin B12  (Send-Out)            Vitamin D deficiency, unspecified           Orders:       44871  VITD - [facility] Vitamin D, 25 Hydroxy  (Send-Out)               Patient Recommendations:        For  Anxiety:     Schedule a follow-up visit in 3 months.                APPOINTMENT INFORMATION:        Monday Tuesday Wednesday Thursday Friday Saturday Sunday            Time:___________________AM  PM   Date:_____________________         For  Fatigue:             The following laboratory testing has been ordered:             CHARGE CAPTURE:           Primary Diagnosis:     300.02 Anxiety            F41.1    Generalized anxiety disorder              Orders:          02342   Office/outpatient visit; established patient, level 4  (In-House)             APPTO   Appointment need  (In-House)           401.1 Hypertension            I10    Essential (primary) hypertension    780.79 Fatigue            R53.83    Other fatigue    268.9 Vitamin D deficiency, unspecified            E55.9    Vitamin D deficiency,  unspecified

## 2021-05-18 NOTE — PROGRESS NOTES
Chase Leon 1975     Office/Outpatient Visit    Visit Date: Tue, Sep 24, 2019 10:53 am    Provider: Marj Bhatia N.P. (Assistant: Melina Perkins MA)    Location: [facility]        Electronically signed by Marj Bhatia N.P. on  2019 07:40:20 PM                             SUBJECTIVE:        CC:     Mr. Leon is a 43 year old White male.  presents today due to complaints of sinus pressure, headache X 5 days         HPI:         Additionally, he presents with history of acute sinusitis, unspecified.  pt states sinus congestion, headache, sore throat, hard dry cough. States coughing is better but his head is hurting daily. Can touch forehead and hurts.  Took delsym with some relief.         PHQ-9 Depression Screening: Completed form scanned and in chart; Total Score 9     ROS:     CONSTITUTIONAL:  Negative for chills, fatigue and fever.      CARDIOVASCULAR:  Negative for chest pain, orthopnea, paroxysmal nocturnal dyspnea and pedal edema.      RESPIRATORY:  Positive for recent cough.   Negative for dyspnea or cough.      GASTROINTESTINAL:  Negative for abdominal pain, heartburn, constipation, diarrhea, and stool changes.      MUSCULOSKELETAL:  Negative for arthralgias, back pain, and myalgias.      NEUROLOGICAL:  Negative for dizziness, headaches, paresthesias, and weakness.      PSYCHIATRIC:  Negative for anxiety and depression.          PM/FMH/SH:     Last Reviewed on 2019 11:10 AM by Marj Bhatia    Past Medical History:             PAST MEDICAL HISTORY     UNREMARKABLE         Surgical History:         Vasectomy: 2006;         Family History:     Father: Suicide      Mother: Healthy     Brother(s): 0 brother(s) total     Sister(s): Healthy; 1 sister(s) total     Son(s): Healthy; 1 son(s) total     Daughter(s): Healthy; 1 daughter(s) total     Paternal Grandfather: ;  CVA     Paternal Grandmother: ;  CVA     Maternal Grandfather: ;   Myocardial Infarction     Maternal Grandmother: Alzheimer's Disease         Tobacco/Alcohol/Supplements:     Last Reviewed on 9/24/2019 11:10 AM by Marj Bhatia    Tobacco: Current Smoker: He currently smokes every day, Cigars.          Alcohol: Frequency: Daily When he drinks, the average quantity of alcohol is 8 drinks.   He typically consumes beer.          Substance Abuse History:     Last Reviewed on 9/24/2019 11:10 AM by Marj Bhatia         Mental Health History:     Last Reviewed on 9/24/2019 11:10 AM by Marj Bhatia        Communicable Diseases (eg STDs):     Last Reviewed on 9/24/2019 11:10 AM by Marj Bhatia            Current Problems:     Last Reviewed on 9/24/2019 11:10 AM by Marj Bhatia    Vitamin D deficiency, unspecified     Fatigue     Anxiety     Hypertension     Screening for depression         Immunizations:     None        Allergies:     Last Reviewed on 9/24/2019 11:10 AM by Marj Bhatia      No Known Drug Allergies.         Current Medications:     Last Reviewed on 9/24/2019 11:10 AM by Marj Bhatia    Lisinopril/Hydrochlorothiazide 20mg/25mg Tablet one a day     Lexapro 10mg Tablet 1 tab daily     Vitamin D3 5,000IU Capsules 1 capsule every day         OBJECTIVE:        Vitals:         Current: 9/24/2019 10:57:34 AM    Ht:  5 ft, 10 in;  Wt: 214.6 lbs;  BMI: 30.8    T: 98.6 F (oral);  BP: 127/86 mm Hg (right arm, sitting);  P: 79 bpm (right arm (BP Cuff), sitting);  sCr: 0.98 mg/dL;  GFR: 102.00        Exams:     PHYSICAL EXAM:     GENERAL: Vitals recorded well developed, well nourished;  well groomed;  no apparent distress;     EYES: lids and lacrimal system are normal in appearance; extraocular movements intact; conjunctiva and cornea are normal; PERRLA;     E/N/T: EARS:  normal external auditory canals and tympanic membranes;  grossly normal hearing; NOSE: nasal mucosa is erythematous;  right maxillary and right frontal sinus tenderness present;  OROPHARYNX: oral mucosa reveals erythema;  posterior pharynx shows erythema and exudate;     NECK:  supple, full ROM; no thyromegaly; no carotid bruits;     RESPIRATORY: normal respiratory rate and pattern with no distress; normal breath sounds with no rales, rhonchi, wheezes or rubs;     CARDIOVASCULAR: normal rate; rhythm is regular;  normal S1; normal S2; no systolic murmur; no cyanosis; no edema;     GASTROINTESTINAL: nontender, nondistended; no hepatosplenomegaly or masses; no bruits;     SKIN:  no significant rashes or lesions; no suspicious moles;     MUSCULOSKELETAL:  Normal range of motion, strength and tone;     NEUROLOGICAL:  cranial nerves, motor and sensory function, reflexes, gait and coordination are all intact;     PSYCHIATRIC:  appropriate affect and demeanor; normal speech pattern; grossly normal memory;         ASSESSMENT:           461.9   J01.90  Acute sinusitis, unspecified              DDx:     V79.0   Z13.89  Screening for depression              DDx:         ORDERS:         Meds Prescribed:       Amoxicillin 875mg Tablet One PO BID X 10 days.  #20 (Twenty) tablet(s) Refills: 0                 PLAN:          Acute sinusitis, unspecified           Prescriptions:       Amoxicillin 875mg Tablet One PO BID X 10 days.  #20 (Twenty) tablet(s) Refills: 0             Other Orders:       [id number]  Office/outpatient visit; established patient, level 3  (In-House)           CHARGE CAPTURE:           Primary Diagnosis:     461.9 Acute sinusitis, unspecified            J01.90    Acute sinusitis, unspecified    V79.0 Screening for depression            Z13.89    Encounter for screening for other disorder        Other Orders:           [id number]   Office/outpatient visit; established patient, level 3  (In-House)

## 2021-05-18 NOTE — PROGRESS NOTES
Chase Leon  1975     Office/Outpatient Visit    Visit Date:  08:26 am    Provider: Sammy Denton N.P. (Assistant: Flaquita Romero, )    Location: [facility]        Electronically signed by Sammy Denton N.P. on  2021 01:01:26 PM                             Subjective:        CC: Mr. Leon is a 45 year old White male.  4 month follow up, back pain (PHONE [phone])         HPI:       Telephone visit for fu on low back pain , Has been taking muscle relaxer and NSAID and attending PT as directed with limited improvement. Denies bowel or bladder issues, or new injuries     ROS:     CONSTITUTIONAL:  Negative for chills, fatigue, fever, and weight change.      CARDIOVASCULAR:  Negative for chest pain, palpitations, tachycardia, orthopnea, and edema.      RESPIRATORY:  Negative for recent cough, dyspnea and frequent wheezing.      GASTROINTESTINAL:  Negative for abdominal pain, constipation, diarrhea, nausea and vomiting.      MUSCULOSKELETAL:  Positive for back pain ( acute; chronic ).      PSYCHIATRIC:  Negative for anxiety, depression, and sleep disturbances.          Past Medical History / Family History / Social History:         Last Reviewed on 10/19/2020 09:15 AM by Sammy Denton    Past Medical History:             PAST MEDICAL HISTORY     UNREMARKABLE         Surgical History:         Vasectomy: ;         Family History:     Father: Suicide      Mother: Healthy     Brother(s): 0 brother(s) total     Sister(s): Healthy; 1 sister(s) total     Son(s): Healthy; 1 son(s) total     Daughter(s): Healthy; 1 daughter(s) total     Paternal Grandfather: ;  CVA     Paternal Grandmother: ;  CVA     Maternal Grandfather: ;  Myocardial Infarction     Maternal Grandmother: Alzheimer's Disease         Social History:     Occupation: [employer]     Marital Status:      Children: 2 children         Tobacco/Alcohol/Supplements:      Last Reviewed on 10/19/2020 09:15 AM by Sammy Denton    Tobacco: Current Smoker: He currently smokes every day, Cigars.          Alcohol: Frequency: Daily When he drinks, the average quantity of alcohol is 8 drinks.   He typically consumes beer.          Substance Abuse History:     Last Reviewed on 10/19/2020 09:15 AM by Sammy Denton    None         Mental Health History:     Last Reviewed on 10/19/2020 09:15 AM by Sammy Denton        Communicable Diseases (eg STDs):     Last Reviewed on 10/19/2020 09:15 AM by Sammy Denton        Current Problems:     Last Reviewed on 10/19/2020 09:15 AM by Sammy Denton    Essential (primary) hypertension    Hypertension    Anxiety    Generalized anxiety disorder    Vitamin D deficiency, unspecified    Vitamin D deficiency, unspecified    Encounter for screening for other disorder    Screening for depression    Low back pain    Low back pain    Nicotine dependence, unspecified, uncomplicated    Encounter for general adult medical examination without abnormal findings    Encounter for screening for depression    Mixed hyperlipidemia        Immunizations:     None        Allergies:     Last Reviewed on 10/19/2020 09:15 AM by Sammy Denton    No Known Allergies.        Current Medications:     Last Reviewed on 10/19/2020 09:15 AM by Sammy Denton    lisinopriL-hydrochlorothiazide 20-25 mg oral tablet [TAKE 1 TABLET DAILY]    escitalopram oxalate 10 mg oral tablet [TAKE 1 TABLET DAILY]    Meloxicam 15 mg oral tablet [1 tab po daily]    lisinopriL 30 mg oral tablet [TAKE 1 TABLET ONCE DAILY]    fenofibrate 120 mg oral tablet [take 1 tablet (120 mg) by oral route once daily]    amLODIPine 5 mg oral tablet [1 tab daily]        Objective:        Exams:     PHYSICAL EXAM:     GENERAL: no apparent distress;     MUSCULOSKELETAL:  Normal range of motion, strength and tone;     NEUROLOGIC: GROSSLY INTACT     PSYCHIATRIC:  appropriate affect and demeanor;  normal speech pattern; grossly normal memory;         Assessment:         M54.5   Low back pain       F17.200   Nicotine dependence, unspecified, uncomplicated           ORDERS:         Meds Prescribed:       [New Rx] ibuprofen 800 mg oral tablet [take 1 tablet (800 mg) by oral route 3 times per day with food], #90 (ninety) tablets, Refills: 0 (zero)       [New Rx] cyclobenzaprine 10 mg oral tablet [take 1 tablet (10 mg) by oral route 2 times per day], #60 (sixty) tablets, Refills: 0 (zero)         Radiology/Test Orders:       [id number]  Magnetic resonance imaging, spinal canal and contents, lumbar; without contrast  (Send-Out)                      Plan:         Low back pain        RECOMMENDATIONS given include: Further recommendation to be given after test results are complete and ice therapy.  Telehealth: Verbal consent obtained for visit to occur via phone call; Staff, other than provider, present during telephone visit include safia Avalos; Total time spent was 15 minutes; [id number]--Zueaognwz E/M 11-20 minutes     FOLLOW-UP: Schedule follow-up appointments on a p.r.n. basis.:.      FOLLOW-UP TESTING #1:    RADIOLOGY:  I have ordered MRI Lumbar Spine w/o contrast to be done today.            Prescriptions:       [New Rx] ibuprofen 800 mg oral tablet [take 1 tablet (800 mg) by oral route 3 times per day with food], #90 (ninety) tablets, Refills: 0 (zero)       [New Rx] cyclobenzaprine 10 mg oral tablet [take 1 tablet (10 mg) by oral route 2 times per day], #60 (sixty) tablets, Refills: 0 (zero)           Orders:       [id number]  Magnetic resonance imaging, spinal canal and contents, lumbar; without contrast  (Send-Out)              Nicotine dependence, unspecified, uncomplicated    MIPS Smoking cessation encouraged. Counseling for less than 3 minutes.              Patient Recommendations:        For  Low back pain:    Use ice over the affected area.  Schedule follow-up appointments as needed.                 APPOINTMENT INFORMATION:        Monday Tuesday Wednesday Thursday Friday Saturday Sunday            Time:___________________AM  PM   Date:_____________________             Charge Capture:         Primary Diagnosis:     M54.5  Low back pain           Orders:      [id number]  Phys/QHP telephone evaluation 11-20 minutes  (In-House)              F17.200  Nicotine dependence, unspecified, uncomplicated

## 2021-05-18 NOTE — PROGRESS NOTES
Chase Leon 1975     Office/Outpatient Visit    Visit Date: Tue, Jan 9, 2018 08:33 am    Provider: Marj Bhatia N.P. (Assistant: Edel Hollis MA)    Location: [facility]        Electronically signed by Marj Bhatia N.P. on  01/09/2018 12:25:20 PM                             SUBJECTIVE:        CC:     Mr. Leon is a 42 year old White male.  This is his first visit to the clinic.  EST.CARE, BLOODWORK         HPI:         Health checkup noted.  He cannot recall when he last had a physical exam.  He does not perform regular testicular self-exams.      Smoking status: He smokes cigars.          Candidiasis: Pt states his feet have been sweaty and stinking in the last few weeks. More than it ever has in the past. States his feet look wrinkled and like they are wet all the time.          In regard to the hypertension, pt states bp has been high in the past and he recalls being on meds a long time ago. He can't remember why he stopped taking it.  He does not have a bp cuff but will invest in one.     ROS:     CONSTITUTIONAL:  Negative for chills, fatigue, fever and weight change.      CARDIOVASCULAR:  Negative for chest pain, orthopnea, paroxysmal nocturnal dyspnea and pedal edema.      RESPIRATORY:  Negative for dyspnea and cough.      GASTROINTESTINAL:  Negative for abdominal pain, heartburn, constipation, diarrhea, and stool changes.      MUSCULOSKELETAL:  Negative for arthralgias, back pain, and myalgias.      NEUROLOGICAL:  Negative for dizziness, headaches, paresthesias, and weakness.      PSYCHIATRIC:  Negative for anxiety and depression.          PM/FM/SH:     Last Reviewed on 1/09/2018 09:23 AM by Marj Bhatia    Past Medical History:             PAST MEDICAL HISTORY     UNREMARKABLE         Surgical History:         Vasectomy: 2006;         Family History:     Father: Suicide 2012     Mother: Healthy     Brother(s): 0 brother(s) total     Sister(s): Healthy; 1 sister(s)  total     Son(s): Healthy; 1 son(s) total     Daughter(s): Healthy; 1 daughter(s) total     Paternal Grandfather: ;  CVA     Paternal Grandmother: ;  CVA     Maternal Grandfather: ;  Myocardial Infarction     Maternal Grandmother: Alzheimer's Disease         Tobacco/Alcohol/Supplements:     Last Reviewed on 2018 09:23 AM by Marj Bhatia    Tobacco: Current Smoker: He currently smokes every day, Cigars.          Alcohol: Frequency: Daily When he drinks, the average quantity of alcohol is 8 drinks.   He typically consumes beer.          Substance Abuse History:     Last Reviewed on 2018 09:23 AM by Marj Bhatia         Mental Health History:     Last Reviewed on 2018 09:23 AM by Marj Bhatia        Communicable Diseases (eg STDs):     Last Reviewed on 2018 09:23 AM by Marj Bhatia            Current Problems:     Last Reviewed on 2018 09:23 AM by Marj Bhatia    Hypertension     Candidiasis, unspecified site     Tobacco dependence         Immunizations:     None        Allergies:     Last Reviewed on 2018 09:23 AM by Marj Bhatia      No Known Drug Allergies.         Current Medications:     Last Reviewed on 2018 09:23 AM by Marj Bhatia    None        OBJECTIVE:        Vitals:         Current: 2018 8:41:40 AM    Ht:  5 ft, 10 in;  Wt: 195.7 lbs;  BMI: 28.1    T: 98.4 F (oral);  BP: 149/100 mm Hg (left arm, sitting);  P: 81 bpm (left arm (BP Cuff), sitting)        Repeat:     9:02:32 AM     BP:   158/86mm Hg        Exams:     PHYSICAL EXAM:     GENERAL: Vitals recorded well developed, well nourished;  well groomed;  no apparent distress;     EYES: lids and lacrimal system are normal in appearance; extraocular movements intact; conjunctiva and cornea are normal; PERRLA;     E/N/T:  normal EACs, TMs, nasal/oral mucosa, teeth, gingiva, and oropharynx;     NECK:  supple, full ROM; no thyromegaly; no carotid bruits;      RESPIRATORY: normal respiratory rate and pattern with no distress; normal breath sounds with no rales, rhonchi, wheezes or rubs;     CARDIOVASCULAR: normal rate; rhythm is regular;  normal S1; normal S2; no systolic murmur; no cyanosis; no edema;     GASTROINTESTINAL: nontender, nondistended; no hepatosplenomegaly or masses; no bruits;     SKIN: Bilateral feet with white irregular skin patches, strong odor noted.;     MUSCULOSKELETAL:  Normal range of motion, strength and tone;     NEUROLOGICAL:  cranial nerves, motor and sensory function, reflexes, gait and coordination are all intact;     PSYCHIATRIC:  appropriate affect and demeanor; normal speech pattern; grossly normal memory;         ASSESSMENT:           V70.0   Z00.00  Health checkup              DDx:     112.9   B37.9  Candidiasis, unspecified site              DDx:     401.1   I10  Hypertension              DDx:         ORDERS:         Meds Prescribed:       Diflucan (Fluconazole) 100mg Tablet 1 pill daily for 5 days  #5 (Five) tablet(s) Refills: 0       Nystatin 100,000U/1gm Cream apply 2-3 times a day  #1 (One) gm Refills: 0         Lab Orders:       FUTURE  Future order to be done at patients convenience  (Send-Out)         [id number]  BDCBC - [facility] CBC with 3 part diff  (Send-Out)         48142  COMP - [facility] Comp. Metabolic Panel  (Send-Out)         88766  LPDP - [facility] Lipid Panel  (Send-Out)         98109  THYII - [facility] Thyroid panel with TSH (88694, 46057)  (Send-Out)                   PLAN:          Health checkup         FOLLOW-UP TESTING #1: FOLLOW-UP LABORATORY:  Labs to be scheduled in the future include CBC, CMP, lipid panel, and Thyroid Panel.            Orders:       FUTURE  Future order to be done at patients convenience  (Send-Out)         07512  BDCBC - [facility] CBC with 3 part diff  (Send-Out)         52211  COMP - [facility] Comp. Metabolic Panel  (Send-Out)         90294  LPDP - [facility] Lipid Panel  (Send-Out)         53113  THYII - [facility] Thyroid panel with TSH  (55601, 86643)  (Send-Out)            Candidiasis, unspecified site           Prescriptions:       Diflucan (Fluconazole) 100mg Tablet 1 pill daily for 5 days  #5 (Five) tablet(s) Refills: 0       Nystatin 100,000U/1gm Cream apply 2-3 times a day  #1 (One) gm Refills: 0          Hypertension Pt does not want medication at this time. He will monitor at home and return with the log given today. Will consider medication after that time and labs.         RECOMMENDATIONS given include: keep blood pressure log as directed.      FOLLOW-UP: Advised to call if there is no improvement..   Chronic visit follow up             Patient Recommendations:        For  Health checkup:             The following laboratory testing has been ordered: CBC metabolic panel, comprehensive lipid panel         For  Hypertension:     Keep a daily blood pressure log and report elevated blood pressure to provider as directed.                  APPOINTMENT INFORMATION:        Monday Tuesday Wednesday Thursday Friday Saturday Sunday            Time:___________________AM  PM   Date:_____________________             CHARGE CAPTURE:           Primary Diagnosis:     V70.0 Health checkup            Z00.00    Encounter for general adult medical examination without abnormal findings              Orders:          10901   Preventive medicine, new patient, age 40-64 years  (In-House)           112.9 Candidiasis, unspecified site            B37.9    Candidiasis, unspecified              Orders:          [id number] -25  Office/outpatient visit; established patient, level 3  (In-House)           401.1 Hypertension            I10    Essential (primary) hypertension
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
## 2021-05-18 NOTE — PROGRESS NOTES
Chase Leon 1975     Office/Outpatient Visit    Visit Date: Mon, Mar 19, 2018 08:33 am    Provider: Marj Bhatia N.P. (Assistant: Dottie Butler MA)    Location: [facility]        Electronically signed by Marj Bhatia N.P. on  03/23/2018 01:45:24 PM                             SUBJECTIVE:        CC:     Mr. Leon is a 42 year old White male.  Patient presents with concerns of his blood pressure. He struggles with anxiety.          HPI:         Mr. Leon presents with hypertension.  Pt states doing well on med. Here for f/u and refills. Has noticed at times having an elevated heart rate and blood pressure. Most times his bp is 120's/80's.          Dx with anxiety; pt states having several episodes of anxiety attacks. 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 He was told the sunday work was temporary. However, he was told recently at a meeting that the sunday work was going to continue. He pays friends $50 to work for him. However, now that it is getting warmer, they don't want to do it. Pt states having anxiety attacks at work about 3 times per night. He will have chest tightness and pace the floor for about 45 minutes behind his machine. It will pass and then he is ok. States having his car break down recently and had to pay for a new one. States the anxiety is affecting his life with family and kids. States the anxiety attacks come out of nowhere.      ROS:     CONSTITUTIONAL:  Negative for chills, fatigue, fever and weight change.      CARDIOVASCULAR:  Negative for chest pain, orthopnea, paroxysmal nocturnal dyspnea and pedal edema.      RESPIRATORY:  Negative for dyspnea and cough.      GASTROINTESTINAL:  Negative for abdominal pain, heartburn, constipation, diarrhea, and stool changes.      NEUROLOGICAL:  Negative for dizziness, headaches, paresthesias, and weakness.      PSYCHIATRIC:  Positive for anxiety, depression and feelings of stress.    Negative for suicidal thoughts.          PMH/FMH/SH:     Last Reviewed on 3/19/2018 09:11 AM by Marj Bhatia    Past Medical History:             PAST MEDICAL HISTORY     UNREMARKABLE         Surgical History:         Vasectomy: 2006;         Family History:     Father: Suicide      Mother: Healthy     Brother(s): 0 brother(s) total     Sister(s): Healthy; 1 sister(s) total     Son(s): Healthy; 1 son(s) total     Daughter(s): Healthy; 1 daughter(s) total     Paternal Grandfather: ;  CVA     Paternal Grandmother: ;  CVA     Maternal Grandfather: ;  Myocardial Infarction     Maternal Grandmother: Alzheimer's Disease         Tobacco/Alcohol/Supplements:     Last Reviewed on 3/19/2018 09:11 AM by Marj Bhatia    Tobacco: Current Smoker: He currently smokes every day, Cigars.          Alcohol: Frequency: Daily When he drinks, the average quantity of alcohol is 8 drinks.   He typically consumes beer.          Substance Abuse History:     Last Reviewed on 3/19/2018 09:11 AM by Marj Bhatia    None         Mental Health History:     Last Reviewed on 3/19/2018 09:11 AM by Marj Bhatia        Communicable Diseases (eg STDs):     Last Reviewed on 3/19/2018 09:11 AM by Marj Bhatia            Current Problems:     Last Reviewed on 3/19/2018 09:11 AM by Marj Bhatia    Anxiety     Hypertension         Immunizations:     None        Allergies:     Last Reviewed on 3/19/2018 09:11 AM by Marj Bhatia      No Known Drug Allergies.         Current Medications:     Last Reviewed on 3/19/2018 09:11 AM by Marj Bhatia    Lisinopril/Hydrochlorothiazide 20mg/25mg Tablet one a day         OBJECTIVE:        Vitals:         Current: 3/19/2018 8:35:20 AM    Ht:  5 ft, 10 in;  Wt: 200.3 lbs;  BMI: 28.7    T: 97.9 F (oral);  BP: 140/94 mm Hg (left arm, sitting);  P: 88 bpm (left arm (BP Cuff), sitting);  sCr: 0.94 mg/dL;  GFR: 104.29        Exams:     PHYSICAL EXAM:      GENERAL: Vitals recorded well developed, well nourished;  well groomed;  no apparent distress;     EYES: lids and lacrimal system are normal in appearance; extraocular movements intact; conjunctiva and cornea are normal; PERRLA;     E/N/T:  normal EACs, TMs, nasal/oral mucosa, teeth, gingiva, and oropharynx;     NECK:  supple, full ROM; no thyromegaly; no carotid bruits;     RESPIRATORY: normal respiratory rate and pattern with no distress; normal breath sounds with no rales, rhonchi, wheezes or rubs;     CARDIOVASCULAR: normal rate; rhythm is regular;  normal S1; normal S2; no systolic murmur; no cyanosis; no edema;     GASTROINTESTINAL: nontender, nondistended; no hepatosplenomegaly or masses; no bruits;     SKIN:  no significant rashes or lesions; no suspicious moles;     MUSCULOSKELETAL:  Normal range of motion, strength and tone;     NEUROLOGICAL:  cranial nerves, motor and sensory function, reflexes, gait and coordination are all intact;     PSYCHIATRIC:  appropriate affect and demeanor; normal speech pattern; grossly normal memory;         ASSESSMENT:           401.1   I10  Hypertension              DDx:     300.02   F41.1  Anxiety              DDx:         ORDERS:         Meds Prescribed:       Refill of: Lisinopril/Hydrochlorothiazide 20mg/25mg Tablet one a day  #90 (Ninety) tablet(s) Refills: 1       Lexapro (Escitalopram Oxalate)  10mg Tablet 1 tab daily  #30 (Thirty) tablet(s) Refills: 1                 PLAN:          Hypertension         FOLLOW-UP: Advised to call if there is no improvement..   Chronic visit follow up           Prescriptions:       Refill of: Lisinopril/Hydrochlorothiazide 20mg/25mg Tablet one a day  #90 (Ninety) tablet(s) Refills: 1          Anxiety         FOLLOW-UP: Schedule a follow-up appointment in 1 week..   Chronic visit follow up           Prescriptions:       Lexapro (Escitalopram Oxalate)  10mg Tablet 1 tab daily  #30 (Thirty) tablet(s) Refills: 1             Patient  Recommendations:        For  Hypertension:                     APPOINTMENT INFORMATION:        Monday Tuesday Wednesday Thursday Friday Saturday Sunday            Time:___________________AM  PM   Date:_____________________         For  Anxiety:     Schedule a follow-up visit in 1 week.                APPOINTMENT INFORMATION:        Monday Tuesday Wednesday Thursday Friday Saturday Sunday            Time:___________________AM  PM   Date:_____________________             CHARGE CAPTURE:           Primary Diagnosis:     401.1 Hypertension            I10    Essential (primary) hypertension              Orders:          [id number]   Office/outpatient visit; established patient, level 3  (In-House)           300.02 Anxiety            F41.1    Generalized anxiety disorder

## 2021-05-18 NOTE — PROGRESS NOTES
Chase Leon 1975     Office/Outpatient Visit    Visit Date: Tue, Oct 16, 2018 11:55 am    Provider: Marj Bhatia N.P. (Assistant: Flaquita Romero)    Location: [facility]        Electronically signed by Marj Bhatia N.P. on  10/27/2018 01:20:27 PM                             SUBJECTIVE:        CC:     Mr. Leon is a 42 year old White male.  anxiety follow up         HPI:         Patient presents with screening for depression.          PHQ-9 Depression Screening: Completed form scanned and in chart; Total Score 7 Alcohol Consumption Screening: Completed form scanned and in chart; Total Score 10         Additionally, he presents with history of hypertension.  pt thinks his elevated bp is due to anxiety. He is taking med daily.          Additionally, he presents with history of anxiety.  pt currently on lexapro and doing ok. Hoever, he feels he is ready for counseling as he has come situations and concerns he wouldl stephanie to work out.          With regard to the onychomycosis, toenails thick and yellow for several months. He has tried otc meds without much relief.      ROS:     CONSTITUTIONAL:  Negative for chills, fatigue, fever and weight change.      CARDIOVASCULAR:  Negative for chest pain, orthopnea, paroxysmal nocturnal dyspnea and pedal edema.      RESPIRATORY:  Negative for dyspnea and cough.      GASTROINTESTINAL:  Negative for abdominal pain, heartburn, constipation, diarrhea, and stool changes.      NEUROLOGICAL:  Negative for dizziness, headaches, paresthesias, and weakness.      PSYCHIATRIC:  Negative for anxiety and depression.          PMH/FM/SH:     Last Reviewed on 10/16/2018 12:07 PM by Marj Bhatia    Past Medical History:             PAST MEDICAL HISTORY     UNREMARKABLE         Surgical History:         Vasectomy: 2006;         Family History:     Father: Suicide 2012     Mother: Healthy     Brother(s): 0 brother(s) total     Sister(s): Healthy; 1 sister(s) total      Son(s): Healthy; 1 son(s) total     Daughter(s): Healthy; 1 daughter(s) total     Paternal Grandfather: ;  CVA     Paternal Grandmother: ;  CVA     Maternal Grandfather: ;  Myocardial Infarction     Maternal Grandmother: Alzheimer's Disease         Tobacco/Alcohol/Supplements:     Last Reviewed on 10/16/2018 12:07 PM by Marj Bhatia    Tobacco: Current Smoker: He currently smokes every day, Cigars.          Alcohol: Frequency: Daily When he drinks, the average quantity of alcohol is 8 drinks.   He typically consumes beer.          Substance Abuse History:     Last Reviewed on 10/16/2018 12:07 PM by Marj Bhatia    None         Mental Health History:     Last Reviewed on 10/16/2018 12:07 PM by Marj Bhatia        Communicable Diseases (eg STDs):     Last Reviewed on 10/16/2018 12:07 PM by Marj Bhatia            Current Problems:     Last Reviewed on 10/16/2018 12:07 PM by Marj Bhatia    Anxiety     Hypertension     Screening for depression         Immunizations:     None        Allergies:     Last Reviewed on 10/16/2018 12:07 PM by Marj Bhatia      No Known Drug Allergies.         Current Medications:     Last Reviewed on 10/16/2018 12:07 PM by Marj Bhatia    Lexapro 10mg Tablet 1 tab daily     Lisinopril/Hydrochlorothiazide 20mg/25mg Tablet one a day         OBJECTIVE:        Vitals:         Current: 10/16/2018 12:00:01 PM    Ht:  5 ft, 10 in;  Wt: 207.6 lbs;  BMI: 29.8    T: 98.2 F (oral);  BP: 145/101 mm Hg (left arm, sitting);  P: 85 bpm (left arm (BP Cuff), sitting);  sCr: 0.94 mg/dL;  GFR: 105.89        Exams:     PHYSICAL EXAM:     GENERAL: Vitals recorded well developed, well nourished;  well groomed;  no apparent distress;     EYES: lids and lacrimal system are normal in appearance; extraocular movements intact; conjunctiva and cornea are normal; PERRLA;     NECK:  supple, full ROM; no thyromegaly; no carotid bruits;     RESPIRATORY: normal  respiratory rate and pattern with no distress; normal breath sounds with no rales, rhonchi, wheezes or rubs;     CARDIOVASCULAR: normal rate; rhythm is regular;  normal S1; normal S2; no systolic murmur; no cyanosis; no edema;     GASTROINTESTINAL: nontender, nondistended; no hepatosplenomegaly or masses; no bruits;     SKIN: Thick and yellow discolored toenails.;     MUSCULOSKELETAL:  Normal range of motion, strength and tone;     NEUROLOGICAL:  cranial nerves, motor and sensory function, reflexes, gait and coordination are all intact;     PSYCHIATRIC:  appropriate affect and demeanor; normal speech pattern; grossly normal memory;         ASSESSMENT:           V79.0   Z13.89  Screening for depression              DDx:     V79.0   Z13.89  Screening for depression              DDx:     401.1   I10  Hypertension              DDx:     300.02   F41.1  Anxiety              DDx:     110.1   B35.3  Onychomycosis              DDx:         ORDERS:         Meds Prescribed:       Nystatin 100,000U/1gm Cream apply 2-3 times a day  #1 (One) gm Refills: 2       Terbinafine HCl 250mg Tablet 1 tab po daily x 12 wks  #84 (Eight[place] Four) tablet(s) Refills: 0         Lab Orders:       APPTO  Appointment need  (In-House)           Procedures Ordered:       REFER  Referral to Specialist or Other Facility  (Send-Out)                   PLAN:          Hypertension continue med.         RECOMMENDATIONS given include: keep blood pressure log as directed.           Anxiety         REFERRALS:  Referral initiated to a psychologist ( [facility] ).      FOLLOW-UP: Schedule a follow-up visit in 3 months..            Orders:       REFER  Referral to Specialist or Other Facility  (Send-Out)         APPTO  Appointment need  (In-House)            Onychomycosis           Prescriptions:       Nystatin 100,000U/1gm Cream apply 2-3 times a day  #1 (One) gm Refills: 2       Terbinafine HCl 250mg Tablet 1 tab po daily x 12 wks  #84 (Eighty Four)  tablet(s) Refills: 0             Patient Recommendations:        For  Hypertension:     Keep a daily blood pressure log and report elevated blood pressure to provider as directed.          For  Anxiety:     Schedule a follow-up visit in 3 months.                APPOINTMENT INFORMATION:        Monday Tuesday Wednesday Thursday Friday Saturday Sunday            Time:___________________AM  PM   Date:_____________________             CHARGE CAPTURE:           Primary Diagnosis:     V79.0 Screening for depression            Z13.89    Encounter for screening for other disorder              Orders:          [id number]   Office/outpatient visit; established patient, level 4  (In-House)           V79.0 Screening for depression            Z13.89    Encounter for screening for other disorder    401.1 Hypertension            I10    Essential (primary) hypertension    300.02 Anxiety            F41.1    Generalized anxiety disorder              Orders:          APPTO   Appointment need  (In-House)           110.1 Onychomycosis            B35.3    Tinea pedis

## 2021-06-09 RX ORDER — LISINOPRIL 30 MG/1
30 TABLET ORAL DAILY
Qty: 30 TABLET | Refills: 0 | Status: SHIPPED | OUTPATIENT
Start: 2021-06-09 | End: 2021-07-02 | Stop reason: SDUPTHER

## 2021-06-09 NOTE — TELEPHONE ENCOUNTER
Call to pt to inf he is due for ov and labs for refills, home number is incorrect, cell number no ans, vm unavailable.     Pt inf, appt made.

## 2021-06-16 ENCOUNTER — OFFICE VISIT (OUTPATIENT)
Dept: FAMILY MEDICINE CLINIC | Age: 46
End: 2021-06-16

## 2021-06-16 VITALS
HEIGHT: 70 IN | BODY MASS INDEX: 32.73 KG/M2 | SYSTOLIC BLOOD PRESSURE: 114 MMHG | DIASTOLIC BLOOD PRESSURE: 78 MMHG | HEART RATE: 74 BPM | TEMPERATURE: 98.5 F | WEIGHT: 228.6 LBS

## 2021-06-16 DIAGNOSIS — Z00.00 ANNUAL PHYSICAL EXAM: Primary | ICD-10-CM

## 2021-06-16 DIAGNOSIS — I10 ESSENTIAL (PRIMARY) HYPERTENSION: ICD-10-CM

## 2021-06-16 DIAGNOSIS — Z23 ENCOUNTER FOR ADMINISTRATION OF VACCINE: ICD-10-CM

## 2021-06-16 PROCEDURE — 99396 PREV VISIT EST AGE 40-64: CPT | Performed by: NURSE PRACTITIONER

## 2021-06-16 RX ORDER — AMLODIPINE BESYLATE 5 MG/1
5 TABLET ORAL DAILY
COMMUNITY
End: 2021-09-29

## 2021-06-16 RX ORDER — TETANUS AND DIPHTHERIA TOXOIDS ADSORBED 2; 2 [LF]/.5ML; [LF]/.5ML
0.5 INJECTION INTRAMUSCULAR ONCE
Qty: 0.5 ML | Refills: 0 | Status: SHIPPED | OUTPATIENT
Start: 2021-06-16 | End: 2021-06-16

## 2021-06-16 RX ORDER — ESCITALOPRAM OXALATE 10 MG/1
10 TABLET ORAL DAILY
COMMUNITY
Start: 2021-06-01 | End: 2021-08-24

## 2021-06-16 RX ORDER — LISINOPRIL AND HYDROCHLOROTHIAZIDE 25; 20 MG/1; MG/1
TABLET ORAL
COMMUNITY
End: 2021-10-29

## 2021-06-16 RX ORDER — TRAMADOL HYDROCHLORIDE 50 MG/1
50 TABLET ORAL EVERY 8 HOURS PRN
COMMUNITY
Start: 2021-06-09

## 2021-06-16 RX ORDER — MELOXICAM 15 MG/1
TABLET ORAL
COMMUNITY
End: 2022-08-02

## 2021-06-16 RX ORDER — FENOFIBRATE 120 MG/1
120 TABLET ORAL DAILY
COMMUNITY
End: 2021-09-29

## 2021-06-16 NOTE — PROGRESS NOTES
"Chief Complaint  Annual exam and Med refills.     Subjective          Chase Leon presents to [facility]     Here for annual exam due for labs, no changes from previous. Age 45 , no screening PSA or CLN done as not age 51 yet. Taking BP med and High chol med without issues.     Review of Systems   Constitutional: Negative for fatigue and fever.   Respiratory: Negative for cough, shortness of breath and wheezing.    Cardiovascular: Negative for chest pain, palpitations and leg swelling.   Gastrointestinal: Negative for abdominal pain.   Genitourinary: Negative for difficulty urinating and dysuria.   Musculoskeletal: Negative for gait problem.   Skin: Negative.    Neurological: Negative for tremors, seizures and weakness.   Psychiatric/Behavioral: Negative for behavioral problems, self-injury and suicidal ideas.        Objective   Vital Signs:     /78 (BP Location: Right arm, Patient Position: Sitting)   Pulse 74   Temp 98.5 °F (36.9 °C) (Oral)   Ht 177.8 cm (70\")   Wt 104 kg (228 lb 9.6 oz)   BMI 32.80 kg/m²       Physical Exam  Constitutional:       Appearance: Normal appearance.   Neck:      Vascular: No carotid bruit.   Cardiovascular:      Rate and Rhythm: Normal rate and regular rhythm.      Heart sounds: Normal heart sounds.   Pulmonary:      Effort: Pulmonary effort is normal.      Breath sounds: Normal breath sounds.   Musculoskeletal:         General: Normal range of motion.   Skin:     General: Skin is warm and dry.   Neurological:      General: No focal deficit present.      Mental Status: He is alert.   Psychiatric:         Mood and Affect: Mood normal.         Behavior: Behavior normal.          Result Review :                  Assessment and Plan    Diagnoses and all orders for this visit:    1. Annual physical exam (Primary)  -     Urinalysis With Culture If Indicated -; Future  -     Comprehensive Metabolic Panel; Future  -     CBC & Differential; " Future  -     TSH; Future  -     Lipid Panel; Future    2. Essential (primary) hypertension    3. Encounter for administration of vaccine  -     tetanus-diphtheria toxoids (DECAVAC 2-2) 2-2 LF/0.5ML injection; Inject 0.5 mL into the appropriate muscle as directed by prescriber 1 (One) Time for 1 dose.  Dispense: 0.5 mL; Refill: 0        Follow Up {  Wrapup  LOS  Follow-up  Instructions  Communications :23}   Return in about 1 year (around 6/16/2022), or if symptoms worsen or fail to improve.  Patient was given instructions and counseling regarding his condition or for health maintenance advice. Please see specific information pulled into the AVS if appropriate.     The patient was counseled regarding nutrition, physical activity, healthy weight, injury prevention, misuse of tobacco, alcohol and illicit drugs, sexual behavior and STI's, contraception, dental health, mental health, immunizations, and screenings.

## 2021-06-17 ENCOUNTER — LAB (OUTPATIENT)
Dept: LAB | Facility: HOSPITAL | Age: 46
End: 2021-06-17

## 2021-06-17 DIAGNOSIS — Z00.00 ANNUAL PHYSICAL EXAM: ICD-10-CM

## 2021-06-17 LAB
BACTERIA UR QL AUTO: ABNORMAL /HPF
BILIRUB UR QL STRIP: NEGATIVE
CLARITY UR: CLEAR
COD CRY URNS QL: ABNORMAL /HPF
COLOR UR: YELLOW
GLUCOSE UR STRIP-MCNC: NEGATIVE MG/DL
HGB UR QL STRIP.AUTO: NEGATIVE
KETONES UR QL STRIP: NEGATIVE
LEUKOCYTE ESTERASE UR QL STRIP.AUTO: NEGATIVE
MUCOUS THREADS URNS QL MICRO: ABNORMAL /HPF
NITRITE UR QL STRIP: NEGATIVE
PH UR STRIP.AUTO: 5.5 [PH] (ref 5–8)
PROT UR QL STRIP: ABNORMAL
RBC # UR: ABNORMAL /HPF
REF LAB TEST METHOD: ABNORMAL
SP GR UR STRIP: >=1.03 (ref 1–1.03)
SQUAMOUS #/AREA URNS HPF: ABNORMAL /HPF
UROBILINOGEN UR QL STRIP: ABNORMAL
WBC UR QL AUTO: ABNORMAL /HPF

## 2021-06-17 PROCEDURE — 81001 URINALYSIS AUTO W/SCOPE: CPT

## 2021-06-19 DIAGNOSIS — Z00.00 ANNUAL PHYSICAL EXAM: Primary | ICD-10-CM

## 2021-06-22 RX ORDER — TETANUS AND DIPHTHERIA TOXOIDS ADSORBED 2; 2 [LF]/.5ML; [LF]/.5ML
0.5 INJECTION INTRAMUSCULAR ONCE
Qty: 0.5 ML | Refills: 0 | Status: SHIPPED | OUTPATIENT
Start: 2021-06-22 | End: 2021-06-22

## 2021-06-28 ENCOUNTER — LAB (OUTPATIENT)
Dept: LAB | Facility: HOSPITAL | Age: 46
End: 2021-06-28

## 2021-06-28 DIAGNOSIS — Z00.00 ANNUAL PHYSICAL EXAM: ICD-10-CM

## 2021-06-28 LAB
ALBUMIN SERPL-MCNC: 4.7 G/DL (ref 3.5–5.2)
ALBUMIN/GLOB SERPL: 2.5 G/DL
ALP SERPL-CCNC: 37 U/L (ref 39–117)
ALT SERPL W P-5'-P-CCNC: 32 U/L (ref 1–41)
ANION GAP SERPL CALCULATED.3IONS-SCNC: 9.4 MMOL/L (ref 5–15)
AST SERPL-CCNC: 22 U/L (ref 1–40)
BASOPHILS # BLD AUTO: 0.05 10*3/MM3 (ref 0–0.2)
BASOPHILS NFR BLD AUTO: 0.7 % (ref 0–1.5)
BILIRUB SERPL-MCNC: <0.2 MG/DL (ref 0–1.2)
BILIRUB UR QL STRIP: NEGATIVE
BUN SERPL-MCNC: 18 MG/DL (ref 6–20)
BUN/CREAT SERPL: 15.8 (ref 7–25)
CALCIUM SPEC-SCNC: 9 MG/DL (ref 8.6–10.5)
CHLORIDE SERPL-SCNC: 111 MMOL/L (ref 98–107)
CHOLEST SERPL-MCNC: 204 MG/DL (ref 0–200)
CLARITY UR: ABNORMAL
CO2 SERPL-SCNC: 22.6 MMOL/L (ref 22–29)
COLOR UR: ABNORMAL
CREAT SERPL-MCNC: 1.14 MG/DL (ref 0.76–1.27)
DEPRECATED RDW RBC AUTO: 45 FL (ref 37–54)
EOSINOPHIL # BLD AUTO: 0.07 10*3/MM3 (ref 0–0.4)
EOSINOPHIL NFR BLD AUTO: 1 % (ref 0.3–6.2)
ERYTHROCYTE [DISTWIDTH] IN BLOOD BY AUTOMATED COUNT: 13.2 % (ref 12.3–15.4)
GFR SERPL CREATININE-BSD FRML MDRD: 70 ML/MIN/1.73
GLOBULIN UR ELPH-MCNC: 1.9 GM/DL
GLUCOSE SERPL-MCNC: 76 MG/DL (ref 65–99)
GLUCOSE UR STRIP-MCNC: NEGATIVE MG/DL
HCT VFR BLD AUTO: 40.6 % (ref 37.5–51)
HDLC SERPL-MCNC: 47 MG/DL (ref 40–60)
HGB BLD-MCNC: 13.6 G/DL (ref 13–17.7)
HGB UR QL STRIP.AUTO: NEGATIVE
IMM GRANULOCYTES # BLD AUTO: 0.01 10*3/MM3 (ref 0–0.05)
IMM GRANULOCYTES NFR BLD AUTO: 0.1 % (ref 0–0.5)
KETONES UR QL STRIP: NEGATIVE
LDLC SERPL CALC-MCNC: 112 MG/DL (ref 0–100)
LDLC/HDLC SERPL: 2.24 {RATIO}
LEUKOCYTE ESTERASE UR QL STRIP.AUTO: NEGATIVE
LYMPHOCYTES # BLD AUTO: 2.05 10*3/MM3 (ref 0.7–3.1)
LYMPHOCYTES NFR BLD AUTO: 28.2 % (ref 19.6–45.3)
MCH RBC QN AUTO: 30.8 PG (ref 26.6–33)
MCHC RBC AUTO-ENTMCNC: 33.5 G/DL (ref 31.5–35.7)
MCV RBC AUTO: 91.9 FL (ref 79–97)
MONOCYTES # BLD AUTO: 0.83 10*3/MM3 (ref 0.1–0.9)
MONOCYTES NFR BLD AUTO: 11.4 % (ref 5–12)
NEUTROPHILS NFR BLD AUTO: 4.26 10*3/MM3 (ref 1.7–7)
NEUTROPHILS NFR BLD AUTO: 58.6 % (ref 42.7–76)
NITRITE UR QL STRIP: NEGATIVE
PH UR STRIP.AUTO: 6 [PH] (ref 5–8)
PLATELET # BLD AUTO: 311 10*3/MM3 (ref 140–450)
PMV BLD AUTO: 10.8 FL (ref 6–12)
POTASSIUM SERPL-SCNC: 4.1 MMOL/L (ref 3.5–5.2)
PROT SERPL-MCNC: 6.6 G/DL (ref 6–8.5)
PROT UR QL STRIP: ABNORMAL
RBC # BLD AUTO: 4.42 10*6/MM3 (ref 4.14–5.8)
SODIUM SERPL-SCNC: 143 MMOL/L (ref 136–145)
SP GR UR STRIP: >1.03 (ref 1–1.03)
TRIGL SERPL-MCNC: 259 MG/DL (ref 0–150)
TSH SERPL DL<=0.05 MIU/L-ACNC: 2.71 UIU/ML (ref 0.27–4.2)
UROBILINOGEN UR QL STRIP: ABNORMAL
VLDLC SERPL-MCNC: 45 MG/DL (ref 5–40)
WBC # BLD AUTO: 7.27 10*3/MM3 (ref 3.4–10.8)

## 2021-06-28 PROCEDURE — 85025 COMPLETE CBC W/AUTO DIFF WBC: CPT

## 2021-06-28 PROCEDURE — 36415 COLL VENOUS BLD VENIPUNCTURE: CPT

## 2021-06-28 PROCEDURE — 84443 ASSAY THYROID STIM HORMONE: CPT

## 2021-06-28 PROCEDURE — 80061 LIPID PANEL: CPT

## 2021-06-28 PROCEDURE — 81003 URINALYSIS AUTO W/O SCOPE: CPT

## 2021-06-28 PROCEDURE — 80053 COMPREHEN METABOLIC PANEL: CPT

## 2021-07-01 VITALS
HEIGHT: 70 IN | DIASTOLIC BLOOD PRESSURE: 94 MMHG | WEIGHT: 200.3 LBS | SYSTOLIC BLOOD PRESSURE: 140 MMHG | HEART RATE: 88 BPM | BODY MASS INDEX: 28.67 KG/M2 | TEMPERATURE: 97.9 F

## 2021-07-01 VITALS
DIASTOLIC BLOOD PRESSURE: 105 MMHG | HEART RATE: 82 BPM | SYSTOLIC BLOOD PRESSURE: 138 MMHG | BODY MASS INDEX: 32.04 KG/M2 | TEMPERATURE: 98.4 F | WEIGHT: 223.8 LBS | HEIGHT: 70 IN

## 2021-07-01 VITALS
DIASTOLIC BLOOD PRESSURE: 86 MMHG | HEART RATE: 91 BPM | WEIGHT: 213.6 LBS | HEIGHT: 70 IN | SYSTOLIC BLOOD PRESSURE: 134 MMHG | TEMPERATURE: 98 F | BODY MASS INDEX: 30.58 KG/M2

## 2021-07-01 VITALS
HEIGHT: 70 IN | SYSTOLIC BLOOD PRESSURE: 133 MMHG | HEART RATE: 86 BPM | WEIGHT: 214.2 LBS | BODY MASS INDEX: 30.67 KG/M2 | TEMPERATURE: 97.2 F | DIASTOLIC BLOOD PRESSURE: 90 MMHG

## 2021-07-01 VITALS
DIASTOLIC BLOOD PRESSURE: 90 MMHG | WEIGHT: 215.2 LBS | SYSTOLIC BLOOD PRESSURE: 130 MMHG | BODY MASS INDEX: 30.81 KG/M2 | TEMPERATURE: 97.8 F | HEIGHT: 70 IN | HEART RATE: 84 BPM

## 2021-07-01 VITALS
BODY MASS INDEX: 30.72 KG/M2 | HEIGHT: 70 IN | TEMPERATURE: 98.6 F | WEIGHT: 214.6 LBS | DIASTOLIC BLOOD PRESSURE: 86 MMHG | SYSTOLIC BLOOD PRESSURE: 127 MMHG | HEART RATE: 79 BPM

## 2021-07-01 VITALS
HEART RATE: 81 BPM | SYSTOLIC BLOOD PRESSURE: 158 MMHG | BODY MASS INDEX: 28.02 KG/M2 | TEMPERATURE: 98.4 F | WEIGHT: 195.7 LBS | DIASTOLIC BLOOD PRESSURE: 86 MMHG | HEIGHT: 70 IN

## 2021-07-01 VITALS
TEMPERATURE: 97.8 F | BODY MASS INDEX: 28.9 KG/M2 | HEART RATE: 85 BPM | WEIGHT: 201.9 LBS | HEIGHT: 70 IN | DIASTOLIC BLOOD PRESSURE: 95 MMHG | SYSTOLIC BLOOD PRESSURE: 138 MMHG

## 2021-07-01 VITALS
TEMPERATURE: 98.2 F | WEIGHT: 207.6 LBS | DIASTOLIC BLOOD PRESSURE: 101 MMHG | HEIGHT: 70 IN | SYSTOLIC BLOOD PRESSURE: 145 MMHG | BODY MASS INDEX: 29.72 KG/M2 | HEART RATE: 85 BPM

## 2021-07-02 VITALS
TEMPERATURE: 98.4 F | WEIGHT: 224.6 LBS | SYSTOLIC BLOOD PRESSURE: 130 MMHG | DIASTOLIC BLOOD PRESSURE: 87 MMHG | HEART RATE: 83 BPM | HEIGHT: 70 IN | BODY MASS INDEX: 32.16 KG/M2

## 2021-07-02 VITALS
HEIGHT: 70 IN | HEART RATE: 87 BPM | SYSTOLIC BLOOD PRESSURE: 130 MMHG | WEIGHT: 234 LBS | TEMPERATURE: 97.4 F | BODY MASS INDEX: 33.5 KG/M2 | DIASTOLIC BLOOD PRESSURE: 90 MMHG

## 2021-07-02 RX ORDER — LISINOPRIL 30 MG/1
TABLET ORAL
Qty: 90 TABLET | Refills: 3 | Status: SHIPPED | OUTPATIENT
Start: 2021-07-02 | End: 2021-10-29 | Stop reason: ALTCHOICE

## 2021-07-02 NOTE — TELEPHONE ENCOUNTER
LAST OV: 6/16/21  NEXT OV: None  LAST LAB: 6/28/21     PLEASE SIGN OR ADVISE    WBC   Date Value Ref Range Status   06/28/2021 7.27 3.40 - 10.80 10*3/mm3 Final   02/27/2020 6.29 4.80 - 10.80 10*3/uL Final     RBC   Date Value Ref Range Status   06/28/2021 4.42 4.14 - 5.80 10*6/mm3 Final     Hemoglobin   Date Value Ref Range Status   06/28/2021 13.6 13.0 - 17.7 g/dL Final     Hematocrit   Date Value Ref Range Status   06/28/2021 40.6 37.5 - 51.0 % Final     MCV   Date Value Ref Range Status   06/28/2021 91.9 79.0 - 97.0 fL Final     MCH   Date Value Ref Range Status   06/28/2021 30.8 26.6 - 33.0 pg Final     MCHC   Date Value Ref Range Status   06/28/2021 33.5 31.5 - 35.7 g/dL Final     RDW   Date Value Ref Range Status   06/28/2021 13.2 12.3 - 15.4 % Final     RDW-SD   Date Value Ref Range Status   06/28/2021 45.0 37.0 - 54.0 fl Final     MPV   Date Value Ref Range Status   06/28/2021 10.8 6.0 - 12.0 fL Final     Platelets   Date Value Ref Range Status   06/28/2021 311 140 - 450 10*3/mm3 Final     Neutrophil %   Date Value Ref Range Status   06/28/2021 58.6 42.7 - 76.0 % Final     Lymphocyte %   Date Value Ref Range Status   06/28/2021 28.2 19.6 - 45.3 % Final     Monocyte %   Date Value Ref Range Status   06/28/2021 11.4 5.0 - 12.0 % Final     Eosinophil %   Date Value Ref Range Status   06/28/2021 1.0 0.3 - 6.2 % Final     Basophil %   Date Value Ref Range Status   06/28/2021 0.7 0.0 - 1.5 % Final     Immature Grans %   Date Value Ref Range Status   06/28/2021 0.1 0.0 - 0.5 % Final     Neutrophils, Absolute   Date Value Ref Range Status   06/28/2021 4.26 1.70 - 7.00 10*3/mm3 Final     Lymphocytes, Absolute   Date Value Ref Range Status   06/28/2021 2.05 0.70 - 3.10 10*3/mm3 Final     Monocytes, Absolute   Date Value Ref Range Status   06/28/2021 0.83 0.10 - 0.90 10*3/mm3 Final     Eosinophils, Absolute   Date Value Ref Range Status   06/28/2021 0.07 0.00 - 0.40 10*3/mm3 Final     Basophils, Absolute   Date Value  Ref Range Status   06/28/2021 0.05 0.00 - 0.20 10*3/mm3 Final     Immature Grans, Absolute   Date Value Ref Range Status   06/28/2021 0.01 0.00 - 0.05 10*3/mm3 Final     Lab Results   Component Value Date    GLUCOSE 76 06/28/2021    BUN 18 06/28/2021    CREATININE 1.14 06/28/2021    EGFRIFNONA 70 06/28/2021    BCR 15.8 06/28/2021    K 4.1 06/28/2021    CO2 22.6 06/28/2021    CALCIUM 9.0 06/28/2021    ALBUMIN 4.70 06/28/2021    LABIL2 1.9 12/21/2020    AST 22 06/28/2021    ALT 32 06/28/2021     Lipid Panel    Lipid Panel 12/21/20 6/28/21   Total Cholesterol  204 (A)   Total Cholesterol 189    Triglycerides 159 (A) 259 (A)   HDL Cholesterol 57 47   VLDL Cholesterol 32 45 (A)   LDL Cholesterol  100 112 (A)   LDL/HDL Ratio  2.24   (A) Abnormal value       Comments are available for some flowsheets but are not being displayed.

## 2021-08-24 RX ORDER — ESCITALOPRAM OXALATE 10 MG/1
10 TABLET ORAL DAILY
Qty: 30 TABLET | Refills: 0 | Status: SHIPPED | OUTPATIENT
Start: 2021-08-24 | End: 2021-09-25

## 2021-08-24 NOTE — TELEPHONE ENCOUNTER
Rx Refill Note  Requested Prescriptions     Pending Prescriptions Disp Refills   • escitalopram (LEXAPRO) 10 MG tablet [Pharmacy Med Name: ESCITALOPRAM TAB 10MG] 90 tablet 0     Sig: TAKE 1 TABLET DAILY      Last office visit with prescribing clinician: 6/16/2021      Next office visit with prescribing clinician: Visit date not found/ LMTRC needs appt to est. Care with new provider      {TIP  Is Refill Pharmacy correct?YES  Josselyn Lee  08/24/21, 09:33 EDT

## 2021-09-24 NOTE — TELEPHONE ENCOUNTER
Rx Refill Note  Requested Prescriptions     Pending Prescriptions Disp Refills   • escitalopram (LEXAPRO) 10 MG tablet [Pharmacy Med Name: ESCITALOPRAM TAB 10MG] 30 tablet 0     Sig: TAKE 1 TABLET DAILY      Last office visit with prescribing clinician: 6/16/21     Next office visit with prescribing clinician: Visit date not found/ UNABLE TO LEAVE A MESSAGE ON PHONE, NO VM SET UP- LETTER SENT      Josselyn Lee  09/24/21, 11:28 EDT

## 2021-09-25 RX ORDER — ESCITALOPRAM OXALATE 10 MG/1
TABLET ORAL
Qty: 90 TABLET | Refills: 0 | Status: SHIPPED | OUTPATIENT
Start: 2021-09-25 | End: 2021-12-20 | Stop reason: SDUPTHER

## 2021-09-25 NOTE — TELEPHONE ENCOUNTER
I will send prescription refill, but he needs to schedule appointment to get established with new PCP    mas

## 2021-10-01 NOTE — PROGRESS NOTES
Chief Complaint  Hypertension and Establish Care    Subjective          Chase Leon presents to [facility]  Patient is here to establish care.  He is a former patient of Sammy Denton.    HTN: He is taking amlodipine 5 mg daily and lisinopril/HCTZ 20/25 mg daily.  He does check his BP at home, which runs around 120s/80s.  He will smoke a couple of cigars in the morning.  He does try to avoid adding salt to his food.  He denies chest pain, blurred vision, H/A, pedal edema, dyspnea.    Anxiety: He is taking Lexapro 10 mg daily.  He has been on the medication for several years.  It is effective for his symptoms.  He denies SI/HI.      HLD: He is taking fenofibrate 120 mg daily for high triglycerides.  He has tried cutting back on his fatty food intake.      Low back pain: He sees pain management and takes tramadol 50 mg q8hprn and meloxicam 15 mg daily.  He is wanting to try get off his medication.     He does drink a couple of beers daily.  He is not interested in the flu vaccine at this time.      Objective   Vital Signs:   /87 (BP Location: Right arm, Patient Position: Sitting)   Pulse 75   Wt 103 kg (226 lb)   BMI 32.43 kg/m²     Physical Exam  Constitutional:       General: He is not in acute distress.     Appearance: Normal appearance. He is obese.   HENT:      Head: Normocephalic.   Eyes:      Pupils: Pupils are equal, round, and reactive to light.      Visual Fields: Right eye visual fields normal and left eye visual fields normal.   Neck:      Trachea: Trachea normal.   Cardiovascular:      Rate and Rhythm: Normal rate and regular rhythm.      Heart sounds: Normal heart sounds.   Pulmonary:      Effort: Pulmonary effort is normal.      Breath sounds: Normal breath sounds and air entry.   Musculoskeletal:      Right lower leg: No edema.      Left lower leg: No edema.   Skin:     General: Skin is warm and dry.   Neurological:      Mental Status: He is alert and  oriented to person, place, and time.   Psychiatric:         Mood and Affect: Mood and affect normal.         Behavior: Behavior normal.         Thought Content: Thought content normal.        Result Review :   The following data was reviewed by: MICHAEL Carpio on 10/06/2021:  Lipid Panel (06/28/2021 08:12)  TSH (06/28/2021 08:12)  CBC & Differential (06/28/2021 08:12)  Comprehensive Metabolic Panel (06/28/2021 08:12)               Assessment and Plan    Diagnoses and all orders for this visit:    1. Primary hypertension (Primary)  -     Lipid Panel; Future  -     CBC (No Diff); Future  -     Comprehensive Metabolic Panel; Future    2. Other hyperlipidemia  -     Lipid Panel; Future  -     CBC (No Diff); Future  -     Comprehensive Metabolic Panel; Future    3. Other proteinuria  -     Urinalysis With Microscopic - Urine, Clean Catch; Future        Follow Up   Return in about 6 months (around 4/6/2022) for Pending test results.  Patient was given instructions and counseling regarding his condition or for health maintenance advice. Please see specific information pulled into the AVS if appropriate.

## 2021-10-06 ENCOUNTER — OFFICE VISIT (OUTPATIENT)
Dept: FAMILY MEDICINE CLINIC | Age: 46
End: 2021-10-06

## 2021-10-06 VITALS
HEART RATE: 75 BPM | DIASTOLIC BLOOD PRESSURE: 87 MMHG | BODY MASS INDEX: 32.43 KG/M2 | WEIGHT: 226 LBS | SYSTOLIC BLOOD PRESSURE: 128 MMHG

## 2021-10-06 DIAGNOSIS — R80.8 OTHER PROTEINURIA: ICD-10-CM

## 2021-10-06 DIAGNOSIS — I10 PRIMARY HYPERTENSION: Primary | ICD-10-CM

## 2021-10-06 DIAGNOSIS — E78.49 OTHER HYPERLIPIDEMIA: ICD-10-CM

## 2021-10-06 PROCEDURE — 99213 OFFICE O/P EST LOW 20 MIN: CPT | Performed by: NURSE PRACTITIONER

## 2021-10-06 NOTE — PATIENT INSTRUCTIONS
High Triglycerides Eating Plan  Triglycerides are a type of fat in the blood. High levels of triglycerides can increase your risk of heart disease and stroke. If your triglyceride levels are high, choosing the right foods can help lower your triglycerides and keep your heart healthy. Work with your health care provider or a diet and nutrition specialist (dietitian) to develop an eating plan that is right for you.  What are tips for following this plan?  General guidelines    · Lose weight, if you are overweight. For most people, losing 5-10 lbs (2-5 kg) helps lower triglyceride levels. A weight-loss plan may include.  ? 30 minutes of exercise at least 5 days a week.  ? Reducing the amount of calories, sugar, and fat you eat.  · Eat a wide variety of fresh fruits, vegetables, and whole grains. These foods are high in fiber.  · Eat foods that contain healthy fats, such as fatty fish, nuts, seeds, and olive oil.  · Avoid foods that are high in added sugar, added salt (sodium), saturated fat, and trans fat.  · Avoid low-fiber, refined carbohydrates such as white bread, crackers, noodles, and white rice.  · Avoid foods with partially hydrogenated oils (trans fats), such as fried foods or stick margarine.  · Limit alcohol intake to no more than 1 drink a day for nonpregnant women and 2 drinks a day for men. One drink equals 12 oz of beer, 5 oz of wine, or 1½ oz of hard liquor. Your health care provider may recommend that you drink less depending on your overall health.    Reading food labels  · Check food labels for the amount of saturated fat. Choose foods with no or very little saturated fat.  · Check food labels for the amount of trans fat. Choose foods with no trans fat.  · Check food labels for the amount of cholesterol. Choose foods low in cholesterol. Ask your dietitian how much cholesterol you should have each day.  · Check food labels for the amount of sodium. Choose foods with less than 140 milligrams (mg) per  serving.  Shopping  · Buy dairy products labeled as nonfat (skim) or low-fat (1%).  · Avoid buying processed or prepackaged foods. These are often high in added sugar, sodium, and fat.  Cooking  · Choose healthy fats when cooking, such as olive oil or canola oil.  · Cook foods using lower fat methods, such as baking, broiling, boiling, or grilling.  · Make your own sauces, dressings, and marinades when possible, instead of buying them. Store-bought sauces, dressings, and marinades are often high in sodium and sugar.  Meal planning  · Eat more home-cooked food and less restaurant, buffet, and fast food.  · Eat fatty fish at least 2 times each week. Examples of fatty fish include salmon, trout, mackerel, tuna, and herring.  · If you eat whole eggs, do not eat more than 3 egg yolks per week.  What foods are recommended?  The items listed may not be a complete list. Talk with your dietitian about what dietary choices are best for you.  Grains  Whole wheat or whole grain breads, crackers, cereals, and pasta. Unsweetened oatmeal. Bulgur. Barley. Quinoa. Brown rice. Whole wheat flour tortillas.  Vegetables  Fresh or frozen vegetables. Low-sodium canned vegetables.  Fruits  All fresh, canned (in natural juice), or frozen fruits.  Meats and other protein foods  Skinless chicken or turkey. Ground chicken or turkey. Lean cuts of pork, trimmed of fat. Fish and seafood, especially salmon, trout, and herring. Egg whites. Dried beans, peas, or lentils. Unsalted nuts or seeds. Unsalted canned beans. Natural peanut or almond butter.  Dairy  Low-fat dairy products. Skim or low-fat (1%) milk. Reduced fat (2%) and low-sodium cheese. Low-fat ricotta cheese. Low-fat cottage cheese. Plain, low-fat yogurt.  Fats and oils  Tub margarine without trans fats. Light or reduced-fat mayonnaise. Light or reduced-fat salad dressings. Avocado. Safflower, olive, sunflower, soybean, and canola oils.  What foods are not recommended?  The items listed  may not be a complete list. Talk with your dietitian about what dietary choices are best for you.  Grains  White bread. White (regular) pasta. White rice. Cornbread. Bagels. Pastries. Crackers that contain trans fat.  Vegetables  Creamed or fried vegetables. Vegetables in a cheese sauce.  Fruits  Sweetened dried fruit. Canned fruit in syrup. Fruit juice.  Meats and other protein foods  Fatty cuts of meat. Ribs. Chicken wings. Rich. Sausage. Bologna. Salami. Chitterlings. Fatback. Hot dogs. Bratwurst. Packaged lunch meats.  Dairy  Whole or reduced-fat (2%) milk. Half-and-half. Cream cheese. Full-fat or sweetened yogurt. Full-fat cheese. Nondairy creamers. Whipped toppings. Processed cheese or cheese spreads. Cheese curds.  Beverages  Alcohol. Sweetened drinks, such as soda, lemonade, fruit drinks, or punches.  Fats and oils  Butter. Stick margarine. Lard. Shortening. Ghee. Rich fat. Tropical oils, such as coconut, palm kernel, or palm oils.  Sweets and desserts  Corn syrup. Sugars. Honey. Molasses. Candy. Jam and jelly. Syrup. Sweetened cereals. Cookies. Pies. Cakes. Donuts. Muffins. Ice cream.  Condiments  Store-bought sauces, dressings, and marinades that are high in sugar, such as ketchup and barbecue sauce.  Summary  · High levels of triglycerides can increase the risk of heart disease and stroke. Choosing the right foods can help lower your triglycerides.  · Eat plenty of fresh fruits, vegetables, and whole grains. Choose low-fat dairy and lean meats. Eat fatty fish at least twice a week.  · Avoid processed and prepackaged foods with added sugar, sodium, saturated fat, and trans fat.  · If you need suggestions or have questions about what types of food are good for you, talk with your health care provider or a dietitian.  This information is not intended to replace advice given to you by your health care provider. Make sure you discuss any questions you have with your health care provider.  Document Revised:  04/21/2021 Document Reviewed: 04/21/2021  Elsevier Patient Education © 2021 [employer]

## 2021-10-08 ENCOUNTER — LAB (OUTPATIENT)
Dept: LAB | Facility: HOSPITAL | Age: 46
End: 2021-10-08

## 2021-10-08 DIAGNOSIS — R80.8 OTHER PROTEINURIA: ICD-10-CM

## 2021-10-08 LAB
BACTERIA UR QL AUTO: NORMAL /HPF
BILIRUB UR QL STRIP: NEGATIVE
CLARITY UR: CLEAR
COD CRY URNS QL: NORMAL /HPF
COLOR UR: YELLOW
GLUCOSE UR STRIP-MCNC: NEGATIVE MG/DL
HGB UR QL STRIP.AUTO: NEGATIVE
KETONES UR QL STRIP: NEGATIVE
LEUKOCYTE ESTERASE UR QL STRIP.AUTO: NEGATIVE
NITRITE UR QL STRIP: NEGATIVE
PH UR STRIP.AUTO: <=5 [PH] (ref 5–8)
PROT UR QL STRIP: NEGATIVE
RBC # UR: NORMAL /HPF
REF LAB TEST METHOD: NORMAL
SP GR UR STRIP: >=1.03 (ref 1–1.03)
SQUAMOUS #/AREA URNS HPF: NORMAL /HPF
UROBILINOGEN UR QL STRIP: NORMAL
WBC UR QL AUTO: NORMAL /HPF

## 2021-10-08 PROCEDURE — 81001 URINALYSIS AUTO W/SCOPE: CPT

## 2021-10-11 ENCOUNTER — LAB (OUTPATIENT)
Dept: LAB | Facility: HOSPITAL | Age: 46
End: 2021-10-11

## 2021-10-11 ENCOUNTER — TELEPHONE (OUTPATIENT)
Dept: FAMILY MEDICINE CLINIC | Age: 46
End: 2021-10-11

## 2021-10-11 DIAGNOSIS — I10 PRIMARY HYPERTENSION: ICD-10-CM

## 2021-10-11 DIAGNOSIS — E78.49 OTHER HYPERLIPIDEMIA: ICD-10-CM

## 2021-10-11 LAB
ALBUMIN SERPL-MCNC: 5.3 G/DL (ref 3.5–5.2)
ALBUMIN/GLOB SERPL: 2.4 G/DL
ALP SERPL-CCNC: 44 U/L (ref 39–117)
ALT SERPL W P-5'-P-CCNC: 25 U/L (ref 1–41)
ANION GAP SERPL CALCULATED.3IONS-SCNC: 11.7 MMOL/L (ref 5–15)
AST SERPL-CCNC: 21 U/L (ref 1–40)
BILIRUB SERPL-MCNC: 0.2 MG/DL (ref 0–1.2)
BUN SERPL-MCNC: 15 MG/DL (ref 6–20)
BUN/CREAT SERPL: 13.9 (ref 7–25)
CALCIUM SPEC-SCNC: 9.6 MG/DL (ref 8.6–10.5)
CHLORIDE SERPL-SCNC: 105 MMOL/L (ref 98–107)
CHOLEST SERPL-MCNC: 208 MG/DL (ref 0–200)
CO2 SERPL-SCNC: 23.3 MMOL/L (ref 22–29)
CREAT SERPL-MCNC: 1.08 MG/DL (ref 0.76–1.27)
DEPRECATED RDW RBC AUTO: 44.3 FL (ref 37–54)
ERYTHROCYTE [DISTWIDTH] IN BLOOD BY AUTOMATED COUNT: 13.3 % (ref 12.3–15.4)
GFR SERPL CREATININE-BSD FRML MDRD: 74 ML/MIN/1.73
GLOBULIN UR ELPH-MCNC: 2.2 GM/DL
GLUCOSE SERPL-MCNC: 79 MG/DL (ref 65–99)
HCT VFR BLD AUTO: 41.9 % (ref 37.5–51)
HDLC SERPL-MCNC: 62 MG/DL (ref 40–60)
HGB BLD-MCNC: 14.3 G/DL (ref 13–17.7)
LDLC SERPL CALC-MCNC: 128 MG/DL (ref 0–100)
LDLC/HDLC SERPL: 2.03 {RATIO}
MCH RBC QN AUTO: 30.6 PG (ref 26.6–33)
MCHC RBC AUTO-ENTMCNC: 34.1 G/DL (ref 31.5–35.7)
MCV RBC AUTO: 89.7 FL (ref 79–97)
PLATELET # BLD AUTO: 358 10*3/MM3 (ref 140–450)
PMV BLD AUTO: 10.3 FL (ref 6–12)
POTASSIUM SERPL-SCNC: 3.9 MMOL/L (ref 3.5–5.2)
PROT SERPL-MCNC: 7.5 G/DL (ref 6–8.5)
RBC # BLD AUTO: 4.67 10*6/MM3 (ref 4.14–5.8)
SODIUM SERPL-SCNC: 140 MMOL/L (ref 136–145)
TRIGL SERPL-MCNC: 100 MG/DL (ref 0–150)
VLDLC SERPL-MCNC: 18 MG/DL (ref 5–40)
WBC # BLD AUTO: 7.79 10*3/MM3 (ref 3.4–10.8)

## 2021-10-11 PROCEDURE — 80053 COMPREHEN METABOLIC PANEL: CPT

## 2021-10-11 PROCEDURE — 85027 COMPLETE CBC AUTOMATED: CPT

## 2021-10-11 PROCEDURE — 80061 LIPID PANEL: CPT

## 2021-10-11 PROCEDURE — 36415 COLL VENOUS BLD VENIPUNCTURE: CPT

## 2021-10-11 NOTE — TELEPHONE ENCOUNTER
Patient was seen by Sammy and given int. FMLA for chronic low back pain. Patient is requesting this FMLA be recertified. Was previouisly given 4 days/month for this issue from 4/15/21-10/15/21. Ok to fill out paperwork?//ag

## 2021-10-29 RX ORDER — LISINOPRIL AND HYDROCHLOROTHIAZIDE 25; 20 MG/1; MG/1
TABLET ORAL
Qty: 90 TABLET | Refills: 1 | Status: SHIPPED | OUTPATIENT
Start: 2021-10-29 | End: 2022-05-03

## 2021-12-20 RX ORDER — ESCITALOPRAM OXALATE 10 MG/1
TABLET ORAL
Qty: 90 TABLET | Refills: 0 | Status: SHIPPED | OUTPATIENT
Start: 2021-12-20 | End: 2022-03-15

## 2021-12-30 DIAGNOSIS — I10 ESSENTIAL (PRIMARY) HYPERTENSION: ICD-10-CM

## 2021-12-30 RX ORDER — AMLODIPINE BESYLATE 5 MG/1
TABLET ORAL
Qty: 90 TABLET | Refills: 0 | Status: SHIPPED | OUTPATIENT
Start: 2021-12-30 | End: 2022-03-15

## 2021-12-30 RX ORDER — FENOFIBRATE 120 MG/1
TABLET ORAL
Qty: 90 TABLET | Refills: 0 | Status: SHIPPED | OUTPATIENT
Start: 2021-12-30

## 2022-02-17 ENCOUNTER — TELEPHONE (OUTPATIENT)
Dept: FAMILY MEDICINE CLINIC | Age: 47
End: 2022-02-17

## 2022-02-17 NOTE — TELEPHONE ENCOUNTER
Pharmacy Name: [facility]      Pharmacy representative name: CHANG    Pharmacy representative phone number: [phone]    What medication are you calling in regards to: LISINOPRIL 30 MG    What question does the pharmacy have: DOSAGE CLARIFICATION SINCE RECENTLY WRITTEN FOR LISINOPRIL HCTZ 20-25 MG    Additional notes: REFERENCE NUMBER [id number],   CHANG STATED  LISINOPRIL 30 MG IS CURRENTLY ON HOLD

## 2022-03-03 ENCOUNTER — TELEPHONE (OUTPATIENT)
Dept: FAMILY MEDICINE CLINIC | Age: 47
End: 2022-03-03

## 2022-03-03 NOTE — TELEPHONE ENCOUNTER
I attempted to reach out to the patient to inform him that we have received his FMLA forms, I tried to call him to collect the $20 fee but was unable to get an answer and unable to leave a voicemail. If patient calls back please fill out the FMLA information form and collect the $20. Thank you, GELA

## 2022-03-15 DIAGNOSIS — I10 ESSENTIAL (PRIMARY) HYPERTENSION: ICD-10-CM

## 2022-03-15 RX ORDER — ESCITALOPRAM OXALATE 10 MG/1
TABLET ORAL
Qty: 90 TABLET | Refills: 0 | Status: SHIPPED | OUTPATIENT
Start: 2022-03-15 | End: 2022-06-13

## 2022-03-15 RX ORDER — AMLODIPINE BESYLATE 5 MG/1
TABLET ORAL
Qty: 90 TABLET | Refills: 0 | Status: SHIPPED | OUTPATIENT
Start: 2022-03-15 | End: 2022-06-13

## 2022-03-28 PROBLEM — F41.9 ANXIETY: Status: ACTIVE | Noted: 2022-03-28

## 2022-03-28 PROBLEM — E78.1 PURE HYPERTRIGLYCERIDEMIA: Status: ACTIVE | Noted: 2022-03-28

## 2022-03-28 PROBLEM — G89.29 CHRONIC LOW BACK PAIN: Status: ACTIVE | Noted: 2022-03-28

## 2022-03-28 PROBLEM — M54.50 CHRONIC LOW BACK PAIN: Status: ACTIVE | Noted: 2022-03-28

## 2022-05-03 RX ORDER — LISINOPRIL AND HYDROCHLOROTHIAZIDE 25; 20 MG/1; MG/1
TABLET ORAL
Qty: 90 TABLET | Refills: 1 | Status: SHIPPED | OUTPATIENT
Start: 2022-05-03 | End: 2022-08-02 | Stop reason: SDUPTHER

## 2022-06-13 DIAGNOSIS — I10 ESSENTIAL (PRIMARY) HYPERTENSION: ICD-10-CM

## 2022-06-13 RX ORDER — ESCITALOPRAM OXALATE 10 MG/1
10 TABLET ORAL DAILY
Qty: 30 TABLET | Refills: 0 | Status: SHIPPED | OUTPATIENT
Start: 2022-06-13 | End: 2022-08-02

## 2022-06-13 RX ORDER — AMLODIPINE BESYLATE 5 MG/1
5 TABLET ORAL DAILY
Qty: 30 TABLET | Refills: 0 | Status: SHIPPED | OUTPATIENT
Start: 2022-06-13 | End: 2022-08-02 | Stop reason: SDUPTHER

## 2022-07-13 RX ORDER — ESCITALOPRAM OXALATE 10 MG/1
TABLET ORAL
Qty: 30 TABLET | Refills: 0 | OUTPATIENT
Start: 2022-07-13

## 2022-07-20 DIAGNOSIS — I10 ESSENTIAL (PRIMARY) HYPERTENSION: ICD-10-CM

## 2022-07-20 RX ORDER — AMLODIPINE BESYLATE 5 MG/1
TABLET ORAL
Qty: 30 TABLET | Refills: 0 | OUTPATIENT
Start: 2022-07-20

## 2022-08-01 NOTE — PROGRESS NOTES
"Chief Complaint  Hypertension (Med refills/)    Subjective          Chase Leon presents to [facility]  Patient returns for follow-up.    HTN: He is taking amlodipine 5 mg daily and lisinopril/HCTZ 20/25 mg daily. BP is controlled.     Anxiety: He is taking Lexapro 10 mg daily. He feels that his anxiety has worsened and would like to increase his dose.      HLD: He is taking fenofibrate 120 mg daily, which he has been out of the medication for a while.  He has been trying to control his triglycerides through diet.    Low back pain: He sees pain management and takes tramadol 50 mg q8hprn. He is no longer taking meloxicam. He reports that his ankles and knees have been hurting and would like a refill.       Objective   Vital Signs:   /72 (BP Location: Right arm, Patient Position: Sitting)   Pulse 80   Ht 177.8 cm (70\")   Wt 105 kg (231 lb 12.8 oz)   BMI 33.26 kg/m²     Physical Exam  Constitutional:       General: He is not in acute distress.     Appearance: Normal appearance. He is normal weight.   HENT:      Head: Normocephalic.   Eyes:      Pupils: Pupils are equal, round, and reactive to light.      Visual Fields: Right eye visual fields normal and left eye visual fields normal.   Neck:      Trachea: Trachea normal.   Cardiovascular:      Rate and Rhythm: Normal rate and regular rhythm.      Heart sounds: Normal heart sounds.   Pulmonary:      Effort: Pulmonary effort is normal.      Breath sounds: Normal breath sounds and air entry.   Musculoskeletal:      Right lower leg: No edema.      Left lower leg: No edema.   Skin:     General: Skin is warm and dry.   Neurological:      Mental Status: He is alert and oriented to person, place, and time.   Psychiatric:         Mood and Affect: Mood and affect normal.         Behavior: Behavior normal.         Thought Content: Thought content normal.        Result Review :   The following data was reviewed by: Hellen Montejo, " APRN on 08/02/2022:  Comprehensive Metabolic Panel (10/11/2021 07:25)  CBC (No Diff) (10/11/2021 07:25)  Lipid Panel (10/11/2021 07:25)                   Assessment and Plan    Diagnoses and all orders for this visit:    1. Essential (primary) hypertension (Primary)  Assessment & Plan:  BP controlled. Continue amlodipine 5 mg daily and lisinopril/HCTZ 20/25 daily.    Orders:  -     CBC (No Diff); Future  -     Comprehensive Metabolic Panel; Future  -     Lipid Panel; Future  -     lisinopril-hydrochlorothiazide (PRINZIDE,ZESTORETIC) 20-25 MG per tablet; Take 1 tablet by mouth Daily.  Dispense: 90 tablet; Refill: 1  -     amLODIPine (NORVASC) 5 MG tablet; Take 1 tablet by mouth Daily. NEEDS FOLLOW UP APPOINTMENT FOR REFILLS  Dispense: 90 tablet; Refill: 1    2. Pure hypertriglyceridemia  Assessment & Plan:  Will check fasting lipid panel, as he would like to try lifestyle modifications first.    Orders:  -     Lipid Panel; Future    3. Anxiety  Assessment & Plan:  Increase Lexapro to 20 mg daily    Orders:  -     escitalopram (LEXAPRO) 20 MG tablet; Take 1 tablet by mouth Daily.  Dispense: 90 tablet; Refill: 1    4. Chronic low back pain, unspecified back pain laterality, unspecified whether sciatica present  Assessment & Plan:  Continue pain management. Refill of meloxicam given.    Orders:  -     meloxicam (MOBIC) 15 MG tablet; Take 1 tablet by mouth Daily.  Dispense: 90 tablet; Refill: 1    5. Screening for colon cancer  -     Ambulatory Referral to General Surgery    6. Screening for diabetes mellitus (DM)  -     Hemoglobin A1c; Future        Follow Up   Return in about 6 months (around 2/2/2023).  Patient was given instructions and counseling regarding his condition or for health maintenance advice. Please see specific information pulled into the AVS if appropriate.     EMR Dragon/Transcription disclaimer: Much of this encounter note is an electronic transcription/translation of spoken language to printed text.  The electronic translation of spoken language may permit erroneous, or at times, nonsensical words or phrases to be inadvertently transcribed; Although I have reviewed the note for such errors, some may still exist.

## 2022-08-02 ENCOUNTER — OFFICE VISIT (OUTPATIENT)
Dept: FAMILY MEDICINE CLINIC | Age: 47
End: 2022-08-02

## 2022-08-02 VITALS
BODY MASS INDEX: 33.18 KG/M2 | HEIGHT: 70 IN | HEART RATE: 80 BPM | SYSTOLIC BLOOD PRESSURE: 116 MMHG | WEIGHT: 231.8 LBS | DIASTOLIC BLOOD PRESSURE: 72 MMHG

## 2022-08-02 DIAGNOSIS — M54.50 CHRONIC LOW BACK PAIN, UNSPECIFIED BACK PAIN LATERALITY, UNSPECIFIED WHETHER SCIATICA PRESENT: ICD-10-CM

## 2022-08-02 DIAGNOSIS — F41.9 ANXIETY: ICD-10-CM

## 2022-08-02 DIAGNOSIS — Z13.1 SCREENING FOR DIABETES MELLITUS (DM): ICD-10-CM

## 2022-08-02 DIAGNOSIS — I10 ESSENTIAL (PRIMARY) HYPERTENSION: Primary | ICD-10-CM

## 2022-08-02 DIAGNOSIS — E78.1 PURE HYPERTRIGLYCERIDEMIA: ICD-10-CM

## 2022-08-02 DIAGNOSIS — Z12.11 SCREENING FOR COLON CANCER: ICD-10-CM

## 2022-08-02 DIAGNOSIS — G89.29 CHRONIC LOW BACK PAIN, UNSPECIFIED BACK PAIN LATERALITY, UNSPECIFIED WHETHER SCIATICA PRESENT: ICD-10-CM

## 2022-08-02 PROCEDURE — 99214 OFFICE O/P EST MOD 30 MIN: CPT | Performed by: NURSE PRACTITIONER

## 2022-08-02 RX ORDER — AMLODIPINE BESYLATE 5 MG/1
5 TABLET ORAL DAILY
Qty: 90 TABLET | Refills: 1 | Status: SHIPPED | OUTPATIENT
Start: 2022-08-02 | End: 2023-01-17

## 2022-08-02 RX ORDER — MELOXICAM 15 MG/1
15 TABLET ORAL DAILY
Qty: 90 TABLET | Refills: 1 | Status: SHIPPED | OUTPATIENT
Start: 2022-08-02 | End: 2023-02-06 | Stop reason: SDUPTHER

## 2022-08-02 RX ORDER — ESCITALOPRAM OXALATE 20 MG/1
20 TABLET ORAL DAILY
Qty: 90 TABLET | Refills: 1 | Status: SHIPPED | OUTPATIENT
Start: 2022-08-02 | End: 2023-02-06 | Stop reason: SDUPTHER

## 2022-08-02 RX ORDER — LISINOPRIL AND HYDROCHLOROTHIAZIDE 25; 20 MG/1; MG/1
1 TABLET ORAL DAILY
Qty: 90 TABLET | Refills: 1 | Status: SHIPPED | OUTPATIENT
Start: 2022-08-02 | End: 2023-02-06 | Stop reason: SDUPTHER

## 2022-08-11 ENCOUNTER — LAB (OUTPATIENT)
Dept: LAB | Facility: HOSPITAL | Age: 47
End: 2022-08-11

## 2022-08-11 DIAGNOSIS — E78.1 PURE HYPERTRIGLYCERIDEMIA: ICD-10-CM

## 2022-08-11 DIAGNOSIS — Z13.1 SCREENING FOR DIABETES MELLITUS (DM): ICD-10-CM

## 2022-08-11 DIAGNOSIS — I10 ESSENTIAL (PRIMARY) HYPERTENSION: ICD-10-CM

## 2022-08-11 LAB
ALBUMIN SERPL-MCNC: 5 G/DL (ref 3.5–5.2)
ALBUMIN/GLOB SERPL: 2.1 G/DL
ALP SERPL-CCNC: 57 U/L (ref 39–117)
ALT SERPL W P-5'-P-CCNC: 28 U/L (ref 1–41)
ANION GAP SERPL CALCULATED.3IONS-SCNC: 14.9 MMOL/L (ref 5–15)
AST SERPL-CCNC: 22 U/L (ref 1–40)
BILIRUB SERPL-MCNC: 0.3 MG/DL (ref 0–1.2)
BUN SERPL-MCNC: 24 MG/DL (ref 6–20)
BUN/CREAT SERPL: 21.2 (ref 7–25)
CALCIUM SPEC-SCNC: 9.7 MG/DL (ref 8.6–10.5)
CHLORIDE SERPL-SCNC: 100 MMOL/L (ref 98–107)
CHOLEST SERPL-MCNC: 249 MG/DL (ref 0–200)
CO2 SERPL-SCNC: 23.1 MMOL/L (ref 22–29)
CREAT SERPL-MCNC: 1.13 MG/DL (ref 0.76–1.27)
DEPRECATED RDW RBC AUTO: 46.2 FL (ref 37–54)
EGFRCR SERPLBLD CKD-EPI 2021: 81.7 ML/MIN/1.73
ERYTHROCYTE [DISTWIDTH] IN BLOOD BY AUTOMATED COUNT: 14 % (ref 12.3–15.4)
GLOBULIN UR ELPH-MCNC: 2.4 GM/DL
GLUCOSE SERPL-MCNC: 81 MG/DL (ref 65–99)
HBA1C MFR BLD: 5.8 % (ref 4.8–5.6)
HCT VFR BLD AUTO: 46.7 % (ref 37.5–51)
HDLC SERPL-MCNC: 55 MG/DL (ref 40–60)
HGB BLD-MCNC: 15 G/DL (ref 13–17.7)
LDLC SERPL CALC-MCNC: 160 MG/DL (ref 0–100)
LDLC/HDLC SERPL: 2.85 {RATIO}
MCH RBC QN AUTO: 28.9 PG (ref 26.6–33)
MCHC RBC AUTO-ENTMCNC: 32.1 G/DL (ref 31.5–35.7)
MCV RBC AUTO: 90 FL (ref 79–97)
PLATELET # BLD AUTO: 317 10*3/MM3 (ref 140–450)
PMV BLD AUTO: 10.4 FL (ref 6–12)
POTASSIUM SERPL-SCNC: 3.8 MMOL/L (ref 3.5–5.2)
PROT SERPL-MCNC: 7.4 G/DL (ref 6–8.5)
RBC # BLD AUTO: 5.19 10*6/MM3 (ref 4.14–5.8)
SODIUM SERPL-SCNC: 138 MMOL/L (ref 136–145)
TRIGL SERPL-MCNC: 187 MG/DL (ref 0–150)
VLDLC SERPL-MCNC: 34 MG/DL (ref 5–40)
WBC NRBC COR # BLD: 8.49 10*3/MM3 (ref 3.4–10.8)

## 2022-08-11 PROCEDURE — 85027 COMPLETE CBC AUTOMATED: CPT

## 2022-08-11 PROCEDURE — 83036 HEMOGLOBIN GLYCOSYLATED A1C: CPT

## 2022-08-11 PROCEDURE — 80061 LIPID PANEL: CPT

## 2022-08-11 PROCEDURE — 80053 COMPREHEN METABOLIC PANEL: CPT

## 2022-08-11 PROCEDURE — 36415 COLL VENOUS BLD VENIPUNCTURE: CPT

## 2022-08-31 ENCOUNTER — TELEPHONE (OUTPATIENT)
Dept: FAMILY MEDICINE CLINIC | Age: 47
End: 2022-08-31

## 2023-01-17 DIAGNOSIS — I10 ESSENTIAL (PRIMARY) HYPERTENSION: ICD-10-CM

## 2023-01-17 RX ORDER — AMLODIPINE BESYLATE 5 MG/1
TABLET ORAL
Qty: 30 TABLET | Refills: 1 | Status: SHIPPED | OUTPATIENT
Start: 2023-01-17 | End: 2023-02-06 | Stop reason: SDUPTHER

## 2023-01-27 PROBLEM — R73.03 PREDIABETES: Status: ACTIVE | Noted: 2023-01-27

## 2023-02-06 ENCOUNTER — OFFICE VISIT (OUTPATIENT)
Dept: FAMILY MEDICINE CLINIC | Age: 48
End: 2023-02-06
Payer: COMMERCIAL

## 2023-02-06 VITALS
BODY MASS INDEX: 31.47 KG/M2 | HEART RATE: 79 BPM | SYSTOLIC BLOOD PRESSURE: 124 MMHG | WEIGHT: 219.8 LBS | DIASTOLIC BLOOD PRESSURE: 85 MMHG | HEIGHT: 70 IN

## 2023-02-06 DIAGNOSIS — M54.50 CHRONIC LOW BACK PAIN, UNSPECIFIED BACK PAIN LATERALITY, UNSPECIFIED WHETHER SCIATICA PRESENT: ICD-10-CM

## 2023-02-06 DIAGNOSIS — E78.1 PURE HYPERTRIGLYCERIDEMIA: ICD-10-CM

## 2023-02-06 DIAGNOSIS — G89.29 CHRONIC LOW BACK PAIN, UNSPECIFIED BACK PAIN LATERALITY, UNSPECIFIED WHETHER SCIATICA PRESENT: ICD-10-CM

## 2023-02-06 DIAGNOSIS — I10 ESSENTIAL (PRIMARY) HYPERTENSION: Primary | ICD-10-CM

## 2023-02-06 DIAGNOSIS — R73.03 PREDIABETES: ICD-10-CM

## 2023-02-06 DIAGNOSIS — F41.9 ANXIETY: ICD-10-CM

## 2023-02-06 PROCEDURE — 99214 OFFICE O/P EST MOD 30 MIN: CPT | Performed by: NURSE PRACTITIONER

## 2023-02-06 RX ORDER — LISINOPRIL AND HYDROCHLOROTHIAZIDE 25; 20 MG/1; MG/1
1 TABLET ORAL DAILY
Qty: 90 TABLET | Refills: 1 | Status: SHIPPED | OUTPATIENT
Start: 2023-02-06

## 2023-02-06 RX ORDER — ESCITALOPRAM OXALATE 20 MG/1
20 TABLET ORAL DAILY
Qty: 90 TABLET | Refills: 1 | Status: SHIPPED | OUTPATIENT
Start: 2023-02-06

## 2023-02-06 RX ORDER — MELOXICAM 15 MG/1
15 TABLET ORAL DAILY
Qty: 90 TABLET | Refills: 1 | Status: SHIPPED | OUTPATIENT
Start: 2023-02-06

## 2023-02-06 RX ORDER — AMLODIPINE BESYLATE 5 MG/1
5 TABLET ORAL DAILY
Qty: 90 TABLET | Refills: 1 | Status: SHIPPED | OUTPATIENT
Start: 2023-02-06

## 2023-02-06 NOTE — ASSESSMENT & PLAN NOTE
He believes that he is not taking fenofibrate anymore.  He has been working on lifestyle modifications.  We will check a fasting lipid panel.

## 2023-02-06 NOTE — ASSESSMENT & PLAN NOTE
He has been doing well on the dose adjustment of his Lexapro.  He reports that he still has occasional bouts of anxiety and depression, but its not nearly as bad.  Continue Lexapro 20 mg daily.

## 2023-02-15 ENCOUNTER — LAB (OUTPATIENT)
Dept: LAB | Facility: HOSPITAL | Age: 48
End: 2023-02-15
Payer: COMMERCIAL

## 2023-02-15 DIAGNOSIS — R73.03 PREDIABETES: ICD-10-CM

## 2023-02-15 DIAGNOSIS — E78.1 PURE HYPERTRIGLYCERIDEMIA: ICD-10-CM

## 2023-02-15 DIAGNOSIS — I10 ESSENTIAL (PRIMARY) HYPERTENSION: ICD-10-CM

## 2023-02-15 LAB
ALBUMIN SERPL-MCNC: 4.9 G/DL (ref 3.5–5.2)
ALBUMIN/GLOB SERPL: 2.6 G/DL
ALP SERPL-CCNC: 50 U/L (ref 39–117)
ALT SERPL W P-5'-P-CCNC: 27 U/L (ref 1–41)
ANION GAP SERPL CALCULATED.3IONS-SCNC: 13.9 MMOL/L (ref 5–15)
AST SERPL-CCNC: 27 U/L (ref 1–40)
BILIRUB SERPL-MCNC: 0.3 MG/DL (ref 0–1.2)
BUN SERPL-MCNC: 14 MG/DL (ref 6–20)
BUN/CREAT SERPL: 12.7 (ref 7–25)
CALCIUM SPEC-SCNC: 9 MG/DL (ref 8.6–10.5)
CHLORIDE SERPL-SCNC: 100 MMOL/L (ref 98–107)
CHOLEST SERPL-MCNC: 234 MG/DL (ref 0–200)
CO2 SERPL-SCNC: 27.1 MMOL/L (ref 22–29)
CREAT SERPL-MCNC: 1.1 MG/DL (ref 0.76–1.27)
DEPRECATED RDW RBC AUTO: 46.1 FL (ref 37–54)
EGFRCR SERPLBLD CKD-EPI 2021: 83.8 ML/MIN/1.73
ERYTHROCYTE [DISTWIDTH] IN BLOOD BY AUTOMATED COUNT: 13.6 % (ref 12.3–15.4)
GLOBULIN UR ELPH-MCNC: 1.9 GM/DL
GLUCOSE SERPL-MCNC: 88 MG/DL (ref 65–99)
HBA1C MFR BLD: 5.6 % (ref 4.8–5.6)
HCT VFR BLD AUTO: 44 % (ref 37.5–51)
HDLC SERPL-MCNC: 63 MG/DL (ref 40–60)
HGB BLD-MCNC: 14.6 G/DL (ref 13–17.7)
LDLC SERPL CALC-MCNC: 154 MG/DL (ref 0–100)
LDLC/HDLC SERPL: 2.41 {RATIO}
MCH RBC QN AUTO: 30 PG (ref 26.6–33)
MCHC RBC AUTO-ENTMCNC: 33.2 G/DL (ref 31.5–35.7)
MCV RBC AUTO: 90.5 FL (ref 79–97)
PLATELET # BLD AUTO: 284 10*3/MM3 (ref 140–450)
PMV BLD AUTO: 10.3 FL (ref 6–12)
POTASSIUM SERPL-SCNC: 3.8 MMOL/L (ref 3.5–5.2)
PROT SERPL-MCNC: 6.8 G/DL (ref 6–8.5)
RBC # BLD AUTO: 4.86 10*6/MM3 (ref 4.14–5.8)
SODIUM SERPL-SCNC: 141 MMOL/L (ref 136–145)
TRIGL SERPL-MCNC: 97 MG/DL (ref 0–150)
VLDLC SERPL-MCNC: 17 MG/DL (ref 5–40)
WBC NRBC COR # BLD: 7.82 10*3/MM3 (ref 3.4–10.8)

## 2023-02-15 PROCEDURE — 80061 LIPID PANEL: CPT

## 2023-02-15 PROCEDURE — 80053 COMPREHEN METABOLIC PANEL: CPT

## 2023-02-15 PROCEDURE — 85027 COMPLETE CBC AUTOMATED: CPT

## 2023-02-15 PROCEDURE — 83036 HEMOGLOBIN GLYCOSYLATED A1C: CPT

## 2023-02-15 PROCEDURE — 36415 COLL VENOUS BLD VENIPUNCTURE: CPT

## 2023-03-01 ENCOUNTER — TELEPHONE (OUTPATIENT)
Dept: FAMILY MEDICINE CLINIC | Age: 48
End: 2023-03-01
Payer: COMMERCIAL

## 2023-03-01 NOTE — TELEPHONE ENCOUNTER
Pt came into office to drop off his [facility] paperwork. The $20 fee was collected. He would like to to  a copy for himself and he will deliver it to his employer. The paperwork was attached to this encounter and placed in Merissa's mailbox.

## 2023-05-30 ENCOUNTER — OFFICE VISIT (OUTPATIENT)
Dept: FAMILY MEDICINE CLINIC | Age: 48
End: 2023-05-30

## 2023-05-30 VITALS — BODY MASS INDEX: 30.86 KG/M2 | HEIGHT: 70 IN | WEIGHT: 215.6 LBS | OXYGEN SATURATION: 98 %

## 2023-05-30 DIAGNOSIS — H66.93 BILATERAL OTITIS MEDIA, UNSPECIFIED OTITIS MEDIA TYPE: ICD-10-CM

## 2023-05-30 DIAGNOSIS — I10 ESSENTIAL (PRIMARY) HYPERTENSION: ICD-10-CM

## 2023-05-30 DIAGNOSIS — R42 DIZZY SPELLS: Primary | ICD-10-CM

## 2023-05-30 DIAGNOSIS — H65.93 FLUID LEVEL BEHIND TYMPANIC MEMBRANE OF BOTH EARS: ICD-10-CM

## 2023-05-30 PROBLEM — M51.369 DEGENERATION OF LUMBAR INTERVERTEBRAL DISC: Status: ACTIVE | Noted: 2021-03-24

## 2023-05-30 PROBLEM — M47.817 LUMBOSACRAL SPONDYLOSIS WITHOUT MYELOPATHY: Status: ACTIVE | Noted: 2021-03-24

## 2023-05-30 PROBLEM — M51.36 DEGENERATION OF LUMBAR INTERVERTEBRAL DISC: Status: ACTIVE | Noted: 2021-03-24

## 2023-05-30 PROCEDURE — 99214 OFFICE O/P EST MOD 30 MIN: CPT | Performed by: NURSE PRACTITIONER

## 2023-05-30 RX ORDER — CEFDINIR 300 MG/1
300 CAPSULE ORAL 2 TIMES DAILY
Qty: 20 CAPSULE | Refills: 0 | Status: SHIPPED | OUTPATIENT
Start: 2023-05-30 | End: 2023-06-09

## 2023-05-30 RX ORDER — MECLIZINE HYDROCHLORIDE 25 MG/1
25 TABLET ORAL 3 TIMES DAILY PRN
Qty: 30 TABLET | Refills: 0 | Status: SHIPPED | OUTPATIENT
Start: 2023-05-30

## 2023-05-30 RX ORDER — FLUTICASONE PROPIONATE 50 MCG
1 SPRAY, SUSPENSION (ML) NASAL DAILY
Qty: 18 G | Refills: 3 | Status: SHIPPED | OUTPATIENT
Start: 2023-05-30

## 2023-05-30 NOTE — PROGRESS NOTES
"Chief Complaint  Dizziness (Sx started two weeks ago )    Subjective        Chase Leon presents to [facility]   Chase is here today with reports of two episodes of severe dizziness and vertigo within the last two weeks. Reports his last episode being Saturday and lasting 3-4 hours. Says he got home from work and was sitting on his couch when he got up and soon after felt dizzy, and had ringing and pressure in his ears. He felt like the room was spinning, left ear went deaf, and had mild nausea, diaphoresis, and chills. Says he could not walk due to the dizziness and had to crawl to his bed. Says that someone came and took him blood pressure when this incident happened and is was in the 150s systolic. Reports taking his blood pressure medications as prescribed. Denies allergies, chest pain, or shortness of breath at this time.    Dizziness  This is a recurrent problem. Associated symptoms include chills, diaphoresis, nausea and vertigo. Pertinent negatives include no abdominal pain, change in bowel habit, chest pain, fever, numbness or weakness. Nothing aggravates the symptoms.       Objective   Vital Signs:  Ht 177.8 cm (70\")   Wt 97.8 kg (215 lb 9.6 oz)   SpO2 98%   BMI 30.94 kg/m²   Estimated body mass index is 30.94 kg/m² as calculated from the following:    Height as of this encounter: 177.8 cm (70\").    Weight as of this encounter: 97.8 kg (215 lb 9.6 oz).     Vitals:    05/30/23 1522 05/30/23 1528 05/30/23 1529   Orthostatic BP: 142/93 145/86 136/90   Orthostatic Pulse: 75 82 83   Patient Position: Lying Sitting Standing             Physical Exam  HENT:      Head: Normocephalic.      Right Ear: A middle ear effusion is present. Tympanic membrane is erythematous.      Left Ear: Decreased hearing noted. A middle ear effusion is present. Tympanic membrane is erythematous.      Nose: Nose normal.   Eyes:      Pupils: Pupils are equal, round, and reactive to light. "   Cardiovascular:      Rate and Rhythm: Normal rate and regular rhythm.      Pulses: Normal pulses.      Heart sounds: Normal heart sounds.   Pulmonary:      Effort: Pulmonary effort is normal.      Breath sounds: Normal breath sounds.   Skin:     General: Skin is warm and dry.   Neurological:      Mental Status: He is alert and oriented to person, place, and time.        Result Review :                   Assessment and Plan   Diagnoses and all orders for this visit:    1. Dizzy spells (Primary)  -     Ambulatory Referral to Physical Therapy Vestibular  -     meclizine (ANTIVERT) 25 MG tablet; Take 1 tablet by mouth 3 (Three) Times a Day As Needed for Dizziness.  Dispense: 30 tablet; Refill: 0    2. Bilateral otitis media, unspecified otitis media type  -     cefdinir (OMNICEF) 300 MG capsule; Take 1 capsule by mouth 2 (Two) Times a Day for 10 days.  Dispense: 20 capsule; Refill: 0    3. Fluid level behind tympanic membrane of both ears  -     fluticasone (FLONASE) 50 MCG/ACT nasal spray; 1 spray into the nostril(s) as directed by provider Daily.  Dispense: 18 g; Refill: 3    4. Essential (primary) hypertension  Comments:  Monitor blood pressure if episode reoccur.       Follow up if symptoms worsen or do not improve. Go to ER if your episodes persist or worsen.      Seen with NP student Sarita Dominguez.         Follow Up   Return in 2 months (on 8/7/2023), or if symptoms worsen or fail to improve, for Next scheduled follow up with PCP.  Patient was given instructions and counseling regarding his condition or for health maintenance advice. Please see specific information pulled into the AVS if appropriate.

## 2023-06-01 DIAGNOSIS — I10 ESSENTIAL (PRIMARY) HYPERTENSION: ICD-10-CM

## 2023-06-01 RX ORDER — AMLODIPINE BESYLATE 5 MG/1
TABLET ORAL
Qty: 30 TABLET | Refills: 1 | Status: SHIPPED | OUTPATIENT
Start: 2023-06-01

## 2023-07-25 ENCOUNTER — OFFICE VISIT (OUTPATIENT)
Dept: FAMILY MEDICINE CLINIC | Age: 48
End: 2023-07-25
Payer: COMMERCIAL

## 2023-07-25 VITALS
HEART RATE: 67 BPM | DIASTOLIC BLOOD PRESSURE: 87 MMHG | HEIGHT: 70 IN | TEMPERATURE: 98.1 F | OXYGEN SATURATION: 98 % | WEIGHT: 221.8 LBS | SYSTOLIC BLOOD PRESSURE: 130 MMHG | BODY MASS INDEX: 31.75 KG/M2

## 2023-07-25 DIAGNOSIS — H65.93 FLUID LEVEL BEHIND TYMPANIC MEMBRANE OF BOTH EARS: ICD-10-CM

## 2023-07-25 DIAGNOSIS — H91.93 BILATERAL HEARING LOSS, UNSPECIFIED HEARING LOSS TYPE: ICD-10-CM

## 2023-07-25 DIAGNOSIS — R42 DIZZY SPELLS: Primary | ICD-10-CM

## 2023-07-25 PROCEDURE — 99213 OFFICE O/P EST LOW 20 MIN: CPT | Performed by: NURSE PRACTITIONER

## 2023-07-25 RX ORDER — FLUTICASONE PROPIONATE 50 MCG
1 SPRAY, SUSPENSION (ML) NASAL DAILY
Qty: 18 G | Refills: 3 | Status: SHIPPED | OUTPATIENT
Start: 2023-07-25

## 2023-07-25 NOTE — PROGRESS NOTES
"Chief Complaint  Ear Fullness (Sx started over the weekend and he states he cannot hear hardly out of each ear ) and Dizziness    Subjective        Chase Leon presents to [facility]  Ear Fullness   There is pain in both ears. This is a recurrent problem. The current episode started in the past 7 days (Ears feel plugged and harder to hear that started this weekend). The problem has been gradually worsening (Reports was overall improved after took omnicef for bilateral ear infection 5/2023, but now symptoms started again over the weekend.). There has been no fever. The pain is mild (describes as discomfort). Associated symptoms include headaches and hearing loss. Pertinent negatives include no abdominal pain, coughing, diarrhea, ear discharge, rhinorrhea, sore throat or vomiting. Treatments tried: use Flonase maybe every other day. Nothing else OTC.   Dizziness  This is a recurrent problem. The current episode started 1 to 4 weeks ago (a couple mild episodes in the past couple of weeks). The problem occurs intermittently. The problem has been unchanged. Associated symptoms include headaches and vertigo. Pertinent negatives include no abdominal pain, chest pain, chills, coughing, fever, myalgias, nausea, numbness, sore throat, urinary symptoms, visual change, vomiting or weakness. Associated symptoms comments: Denies SOA. Will sweat with vertigo episodes.. Nothing aggravates the symptoms. Treatments tried: antivert which slightly takes the edge off. The treatment provided mild relief.     Objective   Vital Signs:  /87 (BP Location: Left arm, Patient Position: Sitting, Cuff Size: Large Adult)   Pulse 67   Temp 98.1 °F (36.7 °C) (Oral)   Ht 177.8 cm (70\")   Wt 101 kg (221 lb 12.8 oz)   SpO2 98% Comment: room air  BMI 31.82 kg/m²   Estimated body mass index is 31.82 kg/m² as calculated from the following:    Height as of this encounter: 177.8 cm (70\").    Weight as of this " encounter: 101 kg (221 lb 12.8 oz).             Physical Exam  Constitutional:       General: He is not in acute distress.     Appearance: He is not ill-appearing, toxic-appearing or diaphoretic.   HENT:      Head: Normocephalic.      Right Ear: No drainage or swelling. A middle ear effusion is present. There is no impacted cerumen. No foreign body. Tympanic membrane is injected. Tympanic membrane is not scarred, perforated, erythematous or bulging.      Left Ear: Ear canal normal. No drainage or swelling. A middle ear effusion is present. There is no impacted cerumen. No foreign body. Tympanic membrane is injected and scarred. Tympanic membrane is not perforated, erythematous or bulging.      Ears:      Comments: Erythema noted to right ear canal with moderate cerumen. Small scarring to superior border of left TM.   Cardiovascular:      Rate and Rhythm: Normal rate and regular rhythm.      Heart sounds: Normal heart sounds.   Pulmonary:      Effort: Pulmonary effort is normal.      Breath sounds: Normal breath sounds.   Lymphadenopathy:      Head:      Right side of head: No submental, submandibular, tonsillar, preauricular or posterior auricular adenopathy.      Left side of head: No submental, submandibular, tonsillar, preauricular or posterior auricular adenopathy.   Neurological:      General: No focal deficit present.      Mental Status: He is alert and oriented to person, place, and time.      Cranial Nerves: No cranial nerve deficit or facial asymmetry.      Sensory: No sensory deficit.      Motor: No weakness.   Psychiatric:         Mood and Affect: Mood normal.         Behavior: Behavior normal.      Result Review :                   Assessment and Plan   Diagnoses and all orders for this visit:    1. Dizzy spells (Primary)  Comments:  Advised can take meclizine or benadryl as needed for dizzy spells. Advised to discuss dizziness with ENT as well.  Orders:  -     Ambulatory Referral to ENT  (Otolaryngology)    2. Fluid level behind tympanic membrane of both ears  Comments:  Discussed effusion can occur for several weeks post Otitis media. No antibiotics today as no signs of infection. Take flonase 1 spray to each nostril daily.  Orders:  -     Ambulatory Referral to ENT (Otolaryngology)  -     fluticasone (FLONASE) 50 MCG/ACT nasal spray; 1 spray into the nostril(s) as directed by provider Daily.  Dispense: 18 g; Refill: 3    3. Bilateral hearing loss, unspecified hearing loss type  Comments:  Use benadryl at bedtime for the next 2-3 nights as well to promote drainage/drying of inner ears. Referring to ENT r/t hearing loss, dizziness, effusions.  Orders:  -     Ambulatory Referral to ENT (Otolaryngology)      Would like further evaluation by ENT to rule out underlying issues r/t ear effusions and hearing loss. Always seek immediate medical attention for dizziness accompanied by neurologic signs like weakness, numbness, tingling, slurred speech, etc.       Follow Up   Return in 13 days (on 8/7/2023), or if symptoms worsen or fail to improve, for Annual physical.  Patient was given instructions and counseling regarding his condition or for health maintenance advice. Please see specific information pulled into the AVS if appropriate.

## 2023-07-26 PROBLEM — E78.49 OTHER HYPERLIPIDEMIA: Status: ACTIVE | Noted: 2023-07-26

## 2023-08-14 DIAGNOSIS — I10 ESSENTIAL (PRIMARY) HYPERTENSION: ICD-10-CM

## 2023-08-14 RX ORDER — AMLODIPINE BESYLATE 5 MG/1
TABLET ORAL
Qty: 30 TABLET | Refills: 1 | Status: SHIPPED | OUTPATIENT
Start: 2023-08-14

## 2023-08-23 ENCOUNTER — OFFICE VISIT (OUTPATIENT)
Dept: OTOLARYNGOLOGY | Facility: CLINIC | Age: 48
End: 2023-08-23
Payer: COMMERCIAL

## 2023-08-23 VITALS — RESPIRATION RATE: 20 BRPM | HEIGHT: 70 IN | WEIGHT: 221 LBS | BODY MASS INDEX: 31.64 KG/M2

## 2023-08-23 DIAGNOSIS — R42 VERTIGO: ICD-10-CM

## 2023-08-23 DIAGNOSIS — H90.42 SENSORINEURAL HEARING LOSS (SNHL) OF LEFT EAR WITH UNRESTRICTED HEARING OF RIGHT EAR: Primary | ICD-10-CM

## 2023-08-23 DIAGNOSIS — H81.02 MENIERE'S DISEASE OF LEFT EAR: ICD-10-CM

## 2023-08-23 DIAGNOSIS — Z72.0 TOBACCO ABUSE: ICD-10-CM

## 2023-08-23 PROCEDURE — 99203 OFFICE O/P NEW LOW 30 MIN: CPT | Performed by: OTOLARYNGOLOGY

## 2023-09-11 ENCOUNTER — TELEPHONE (OUTPATIENT)
Dept: FAMILY MEDICINE CLINIC | Age: 48
End: 2023-09-11
Payer: COMMERCIAL

## 2023-09-11 NOTE — TELEPHONE ENCOUNTER
Pt came to drop off [facility] paperwork to be completed. He would like to pick them up when completed. The $20 fee was collected. The paperwork was attached to this encounter and placed in [place]'s mailbox.

## 2023-09-12 NOTE — TELEPHONE ENCOUNTER
Last ov 2/6/23 and did not follow up in 6 months as instructed. Pt needs appt per Nikki. Called pt, voicemail not set up.

## 2023-09-15 PROBLEM — E78.1 PURE HYPERTRIGLYCERIDEMIA: Status: RESOLVED | Noted: 2022-03-28 | Resolved: 2023-09-15

## 2023-09-15 NOTE — PROGRESS NOTES
"Chief Complaint  Hypertension (Check up/)    Subjective          Chase Leon presents to [facility]  History of Present Illness  HTN: He is taking amlodipine 5 mg daily and lisinopril/HCTZ 20/25 mg daily. BP is a little elevated. He reports that he is in pain today due to work.     HLD: His last lipid panel from February showed his cholesterol 234, triglycerides 97, and .  It was recommended to work on lifestyle modifications to lower his cholesterol. He has not been taking his fenofibrate. He has not been taking his fenofibrate.     Prediabetes: His last A1c was 5.6.    Anxiety: He is taking Lexapro 20 mg daily. He does feel that it is effective for his anxiety.    Chronic back pain: He does follow with pain management ([facility]). He does take meloxicam 15 mg daily.    Objective   Vital Signs:   /97 (BP Location: Left arm, Patient Position: Sitting)   Pulse 89   Ht 177.8 cm (70\")   Wt 104 kg (229 lb 3.2 oz)   BMI 32.89 kg/m²     Physical Exam  Constitutional:       General: He is not in acute distress.     Appearance: Normal appearance. He is normal weight. He is obese.   HENT:      Head: Normocephalic.   Eyes:      Pupils: Pupils are equal, round, and reactive to light.      Visual Fields: Right eye visual fields normal and left eye visual fields normal.   Neck:      Trachea: Trachea normal.   Cardiovascular:      Rate and Rhythm: Normal rate and regular rhythm.      Heart sounds: Normal heart sounds.   Pulmonary:      Effort: Pulmonary effort is normal.      Breath sounds: Normal breath sounds and air entry.   Musculoskeletal:      Right lower leg: No edema.      Left lower leg: No edema.   Skin:     General: Skin is warm and dry.   Neurological:      Mental Status: He is alert and oriented to person, place, and time.   Psychiatric:         Mood and Affect: Mood and affect normal.         Behavior: Behavior normal.         Thought Content: Thought content normal.    "   Result Review :   The following data was reviewed by: MICHAEL Carpio on 09/18/2023:                  Assessment and Plan    Diagnoses and all orders for this visit:    1. Essential (primary) hypertension (Primary)  Assessment & Plan:  Hypertension is unchanged.  Dietary sodium restriction.  Weight loss.  Regular aerobic exercise.  Continue current medications.  Blood pressure will be reassessed at the next regular appointment.    He is to continue amlodipine 5 mg daily and lisinopril 20/25 mg daily.    Orders:  -     CBC (No Diff); Future  -     Comprehensive Metabolic Panel; Future  -     Lipid Panel; Future    2. Other hyperlipidemia  Assessment & Plan:  Will check a fasting lipid panel.    Orders:  -     CBC (No Diff); Future  -     Comprehensive Metabolic Panel; Future  -     Lipid Panel; Future    3. Prediabetes  Assessment & Plan:  Will check an A1c.    Orders:  -     CBC (No Diff); Future  -     Comprehensive Metabolic Panel; Future  -     Lipid Panel; Future  -     Hemoglobin A1c; Future    4. Anxiety  Assessment & Plan:  He is stable on Lexapro 20 mg daily.      5. Chronic low back pain, unspecified back pain laterality, unspecified whether sciatica present  Assessment & Plan:  He is taking meloxicam 15 mg daily and seeing pain management.            Follow Up   Return in about 6 months (around 3/18/2024) for Annual physical.  Patient was given instructions and counseling regarding his condition or for health maintenance advice. Please see specific information pulled into the AVS if appropriate.

## 2023-09-18 ENCOUNTER — OFFICE VISIT (OUTPATIENT)
Dept: FAMILY MEDICINE CLINIC | Age: 48
End: 2023-09-18
Payer: COMMERCIAL

## 2023-09-18 VITALS
BODY MASS INDEX: 32.81 KG/M2 | SYSTOLIC BLOOD PRESSURE: 137 MMHG | HEART RATE: 89 BPM | HEIGHT: 70 IN | DIASTOLIC BLOOD PRESSURE: 97 MMHG | WEIGHT: 229.2 LBS

## 2023-09-18 DIAGNOSIS — F41.9 ANXIETY: ICD-10-CM

## 2023-09-18 DIAGNOSIS — R73.03 PREDIABETES: ICD-10-CM

## 2023-09-18 DIAGNOSIS — G89.29 CHRONIC LOW BACK PAIN, UNSPECIFIED BACK PAIN LATERALITY, UNSPECIFIED WHETHER SCIATICA PRESENT: ICD-10-CM

## 2023-09-18 DIAGNOSIS — I10 ESSENTIAL (PRIMARY) HYPERTENSION: Primary | ICD-10-CM

## 2023-09-18 DIAGNOSIS — M54.50 CHRONIC LOW BACK PAIN, UNSPECIFIED BACK PAIN LATERALITY, UNSPECIFIED WHETHER SCIATICA PRESENT: ICD-10-CM

## 2023-09-18 DIAGNOSIS — E78.49 OTHER HYPERLIPIDEMIA: ICD-10-CM

## 2023-09-18 PROCEDURE — 99214 OFFICE O/P EST MOD 30 MIN: CPT | Performed by: NURSE PRACTITIONER

## 2023-09-18 NOTE — ASSESSMENT & PLAN NOTE
Hypertension is unchanged.  Dietary sodium restriction.  Weight loss.  Regular aerobic exercise.  Continue current medications.  Blood pressure will be reassessed at the next regular appointment.    He is to continue amlodipine 5 mg daily and lisinopril 20/25 mg daily.

## 2023-09-26 ENCOUNTER — LAB (OUTPATIENT)
Dept: LAB | Facility: HOSPITAL | Age: 48
End: 2023-09-26
Payer: COMMERCIAL

## 2023-09-26 DIAGNOSIS — I10 ESSENTIAL (PRIMARY) HYPERTENSION: ICD-10-CM

## 2023-09-26 DIAGNOSIS — E78.49 OTHER HYPERLIPIDEMIA: ICD-10-CM

## 2023-09-26 DIAGNOSIS — R73.03 PREDIABETES: ICD-10-CM

## 2023-09-26 LAB
ALBUMIN SERPL-MCNC: 5.2 G/DL (ref 3.5–5.2)
ALBUMIN/GLOB SERPL: 2.5 G/DL
ALP SERPL-CCNC: 53 U/L (ref 39–117)
ALT SERPL W P-5'-P-CCNC: 24 U/L (ref 1–41)
ANION GAP SERPL CALCULATED.3IONS-SCNC: 17.2 MMOL/L (ref 5–15)
AST SERPL-CCNC: 23 U/L (ref 1–40)
BILIRUB SERPL-MCNC: 0.5 MG/DL (ref 0–1.2)
BUN SERPL-MCNC: 19 MG/DL (ref 6–20)
BUN/CREAT SERPL: 17.4 (ref 7–25)
CALCIUM SPEC-SCNC: 9.6 MG/DL (ref 8.6–10.5)
CHLORIDE SERPL-SCNC: 102 MMOL/L (ref 98–107)
CHOLEST SERPL-MCNC: 256 MG/DL (ref 0–200)
CO2 SERPL-SCNC: 22.8 MMOL/L (ref 22–29)
CREAT SERPL-MCNC: 1.09 MG/DL (ref 0.76–1.27)
DEPRECATED RDW RBC AUTO: 45.3 FL (ref 37–54)
EGFRCR SERPLBLD CKD-EPI 2021: 84.8 ML/MIN/1.73
ERYTHROCYTE [DISTWIDTH] IN BLOOD BY AUTOMATED COUNT: 13.3 % (ref 12.3–15.4)
GLOBULIN UR ELPH-MCNC: 2.1 GM/DL
GLUCOSE SERPL-MCNC: 76 MG/DL (ref 65–99)
HBA1C MFR BLD: 5.5 % (ref 4.8–5.6)
HCT VFR BLD AUTO: 44.4 % (ref 37.5–51)
HDLC SERPL-MCNC: 71 MG/DL (ref 40–60)
HGB BLD-MCNC: 14.7 G/DL (ref 13–17.7)
LDLC SERPL CALC-MCNC: 160 MG/DL (ref 0–100)
LDLC/HDLC SERPL: 2.21 {RATIO}
MCH RBC QN AUTO: 30.7 PG (ref 26.6–33)
MCHC RBC AUTO-ENTMCNC: 33.1 G/DL (ref 31.5–35.7)
MCV RBC AUTO: 92.7 FL (ref 79–97)
PLATELET # BLD AUTO: 306 10*3/MM3 (ref 140–450)
PMV BLD AUTO: 9.7 FL (ref 6–12)
POTASSIUM SERPL-SCNC: 3.8 MMOL/L (ref 3.5–5.2)
PROT SERPL-MCNC: 7.3 G/DL (ref 6–8.5)
RBC # BLD AUTO: 4.79 10*6/MM3 (ref 4.14–5.8)
SODIUM SERPL-SCNC: 142 MMOL/L (ref 136–145)
TRIGL SERPL-MCNC: 139 MG/DL (ref 0–150)
VLDLC SERPL-MCNC: 25 MG/DL (ref 5–40)
WBC NRBC COR # BLD: 8.48 10*3/MM3 (ref 3.4–10.8)

## 2023-09-26 PROCEDURE — 36415 COLL VENOUS BLD VENIPUNCTURE: CPT

## 2023-09-26 PROCEDURE — 83036 HEMOGLOBIN GLYCOSYLATED A1C: CPT

## 2023-09-26 PROCEDURE — 80053 COMPREHEN METABOLIC PANEL: CPT

## 2023-09-26 PROCEDURE — 80061 LIPID PANEL: CPT

## 2023-09-26 PROCEDURE — 85027 COMPLETE CBC AUTOMATED: CPT

## 2023-09-27 RX ORDER — ATORVASTATIN CALCIUM 10 MG/1
10 TABLET, FILM COATED ORAL DAILY
Qty: 90 TABLET | Refills: 1 | Status: SHIPPED | OUTPATIENT
Start: 2023-09-27

## 2023-10-05 DIAGNOSIS — I10 ESSENTIAL (PRIMARY) HYPERTENSION: ICD-10-CM

## 2023-10-05 RX ORDER — AMLODIPINE BESYLATE 5 MG/1
TABLET ORAL
Qty: 30 TABLET | Refills: 1 | Status: SHIPPED | OUTPATIENT
Start: 2023-10-05

## 2023-10-05 RX ORDER — LISINOPRIL AND HYDROCHLOROTHIAZIDE 25; 20 MG/1; MG/1
TABLET ORAL
Qty: 90 TABLET | Refills: 1 | Status: SHIPPED | OUTPATIENT
Start: 2023-10-05

## 2023-10-31 ENCOUNTER — OFFICE VISIT (OUTPATIENT)
Dept: OTOLARYNGOLOGY | Facility: CLINIC | Age: 48
End: 2023-10-31
Payer: COMMERCIAL

## 2023-10-31 ENCOUNTER — PROCEDURE VISIT (OUTPATIENT)
Dept: OTOLARYNGOLOGY | Facility: CLINIC | Age: 48
End: 2023-10-31
Payer: COMMERCIAL

## 2023-10-31 VITALS
WEIGHT: 216.6 LBS | DIASTOLIC BLOOD PRESSURE: 90 MMHG | SYSTOLIC BLOOD PRESSURE: 128 MMHG | HEIGHT: 70 IN | BODY MASS INDEX: 31.01 KG/M2 | TEMPERATURE: 97.8 F | HEART RATE: 69 BPM

## 2023-10-31 DIAGNOSIS — H90.42 SENSORINEURAL HEARING LOSS (SNHL) OF LEFT EAR WITH UNRESTRICTED HEARING OF RIGHT EAR: ICD-10-CM

## 2023-10-31 DIAGNOSIS — H81.02 MENIERE'S DISEASE OF LEFT EAR: Primary | ICD-10-CM

## 2023-10-31 DIAGNOSIS — H81.02 ACTIVE COCHLEOVESTIBULAR MENIERE'S DISEASE OF LEFT EAR: ICD-10-CM

## 2023-10-31 DIAGNOSIS — R42 VERTIGO: ICD-10-CM

## 2023-10-31 DIAGNOSIS — H90.3 SENSORINEURAL HEARING LOSS, ASYMMETRICAL: Primary | ICD-10-CM

## 2023-10-31 PROCEDURE — 92557 COMPREHENSIVE HEARING TEST: CPT | Performed by: AUDIOLOGIST

## 2023-10-31 PROCEDURE — 99214 OFFICE O/P EST MOD 30 MIN: CPT | Performed by: OTOLARYNGOLOGY

## 2023-10-31 PROCEDURE — 92567 TYMPANOMETRY: CPT | Performed by: AUDIOLOGIST

## 2023-10-31 RX ORDER — CYCLOBENZAPRINE HCL 10 MG
TABLET ORAL
COMMUNITY

## 2023-10-31 NOTE — PROGRESS NOTES
AUDIOMETRIC EVALUATION      Name:  Chase Leon  :  1975  Age:  47 y.o.  Date of Evaluation:  10/31/2023       History:  Mr. Leon is seen today for a hearing evaluation due to hearing loss at the left ear.    Audiologic Information:  Concerns for Hearing: Yes  PETs: No  Other otologic surgical history: None  Aural Pressure/Fullness: Yes left ear  Otalgia: No  Otorrhea: No  Tinnitus: Yes left ear   Dizziness: Periodic  Noise Exposure: Yes  Family history of hearing loss: No  Head trauma requiring hospital stay: No  Chemotherapy: No  Other significant history: No    EVALUATION:    See audiogram    RESULTS:    Otoscopic Evaluation:  Right: Unremarkable for audiogram  Left: Unremarkable for audiogram        NOTE: Testing completed after ears were examined by Dr. Alvarado    Tympanometry (226 Hz):  Right: Type A  Left: Type A    IMPRESSIONS:  Pure tone thresholds for the right ear shows low normal hearing.  Pure tone thresholds for the left ear shows a severe rising to mild sensorineural hearing loss at 2K hertz.  Falling to moderate and severe sensorineural hearing loss.  Patient was counseled with regard to the findings.    Amplification needs:  Patient could benefit from amplification if they feel increased communication difficulties.    RECOMMENDATIONS/PLAN:  Follow-up recommendations of Dr. Alvarado  Annual audiogram  Discussed results and recommendations with patient. Questions were addressed and the patient was encouraged to contact our department should concerns arise.          Milton Del Angel M.S, [facility]  Licensed Audiologist

## 2023-10-31 NOTE — PROGRESS NOTES
Patient Name: Chase Leon   Visit Date: 10/31/2023   Patient ID: [id number]  Provider: Deven Alvarado MD    Sex: male  Location: [facility]   YOB: 1975  Location Address: [address] [place] [address], [address] [place] [address],?[place]?[id number]    Primary Care Provider Hellen Montejo, APRN  Location Phone: [phone]    Referring Provider: No ref. provider found        Chief Complaint  Follow-up (With audio results )    Subjective    History of Present Illness  Chase Leon is a 47 y.o. male who presents to [facility] today as a consult from No ref. provider found.    He presents to the clinic today for follow-up regarding his ears and hearing.  Overall he has been doing well but has not had any issues since the last time I saw him in the clinic in August.  Denies any vertigo spells.  Continues to have some ear fullness that is worse on the left side as well as decreased hearing.  There is mild high-pitched nonpulsatile ear ringing as well.  He has been maintaining a low-salt diet.    Audiogram reveals essentially borderline normal hearing on the right side.  The left ear there is significant low-frequency hearing loss upsloping to mild hearing loss at 1000 Hz downsloping to moderate to severe sensorineural hearing loss for high frequencies.  Tympanograms are normal and word discrimination scores are 100% on the right and 80% on the left.    Past Medical History:   Diagnosis Date    Anxiety     Essential (primary) hypertension     Low back pain     Mixed hyperlipidemia     Nicotine dependence     Screening for depression     Vitamin D deficiency        Past Surgical History:   Procedure Laterality Date    VASECTOMY  2006         Current Outpatient Medications:     amLODIPine (NORVASC) 5 MG tablet, TAKE 1 TABLET DAILY, Disp: 30 tablet, Rfl: 1    atorvastatin (LIPITOR) 10 MG tablet, Take 1 tablet by mouth Daily., Disp: 90 tablet, Rfl: 1     "cyclobenzaprine (FLEXERIL) 10 MG tablet, , Disp: , Rfl:     escitalopram (LEXAPRO) 20 MG tablet, Take 1 tablet by mouth Daily., Disp: 90 tablet, Rfl: 1    fenofibrate (FENOGLIDE) 120 MG tablet, TAKE 1 TABLET ONCE DAILY, Disp: 90 tablet, Rfl: 0    lisinopril-hydrochlorothiazide (PRINZIDE,ZESTORETIC) 20-25 MG per tablet, TAKE 1 TABLET DAILY, Disp: 90 tablet, Rfl: 1    meloxicam (MOBIC) 15 MG tablet, Take 1 tablet by mouth Daily., Disp: 90 tablet, Rfl: 1    traMADol (ULTRAM) 50 MG tablet, Take 1 tablet by mouth Every 8 (Eight) Hours As Needed., Disp: , Rfl:     fluticasone (FLONASE) 50 MCG/ACT nasal spray, 1 spray into the nostril(s) as directed by provider Daily. (Patient not taking: Reported on 10/31/2023), Disp: 18 g, Rfl: 3    meclizine (ANTIVERT) 25 MG tablet, Take 1 tablet by mouth 3 (Three) Times a Day As Needed for Dizziness. (Patient not taking: Reported on 10/31/2023), Disp: 30 tablet, Rfl: 0     No Known Allergies    Family History   Problem Relation Age of Onset    Suicidality Father         2012    Alzheimer's disease Maternal Grandmother     Heart attack Maternal Grandfather     Stroke Paternal Grandmother     Stroke Paternal Grandfather         Social History     Social History Narrative    Not on file       Objective     Vital Signs:   /90 (BP Location: Left arm, Patient Position: Sitting, Cuff Size: Large Adult)   Pulse 69   Temp 97.8 °F (36.6 °C) (Tympanic)   Ht 177.8 cm (70\")   Wt 98.2 kg (216 lb 9.6 oz)   BMI 31.08 kg/m²       Physical Exam    Constitutional   Appearance  : well developed, well-nourished, alert and in no acute distress, voice clear and strong    Head  Inspection  : no deformities or lesions  Face  Inspection  : No facial lesions; House-Brackmann I/VI bilaterally  Palpation  : No TMJ crepitus nor  muscle tenderness bilaterally    Eyes  Vision  Visual Fields  : Extraocular movements are intact. No spontaneous or gaze-induced nystagmus.  Conjunctivae  : " clear  Sclerae  : clear  Pupils and Irises  : pupils equal, round, and reactive to light.     Ears, Nose, Mouth and Throat    Ears    External Ears  : appearance within normal limits, no lesions present  Otoscopic Examination  : Tympanic membrane appearance within normal limits bilaterally without perforations, well-aerated middle ears  Hearing  : intact to conversational voice both ears  Tunning fork testing:     :    Nose    External Nose  : appearance normal  Intranasal Exam  : mucosa within normal limits, vestibules normal, no intranasal lesions present, septum midline, sinuses non tender to percussion  Oral Cavity    Oral Mucosa  : oral mucosa normal without pallor or cyanosis  Lips  : lip appearance normal  Teeth  : normal dentition for age  Gums  : gums pink, non-swollen, no bleeding present  Tongue  : tongue appearance normal; normal mobility  Palate  : hard palate normal, soft palate appearance normal with symmetric mobility    Throat    Oropharynx  : no inflammation or lesions present, tonsils within normal limits  Hypopharynx  : appearance within normal limits, superior epiglottis within normal limits  Larynx  : appearance within normal limits, vocal cords within normal limits, no lesions present    Neck  Inspection/Palpation  : normal appearance, no masses or tenderness, trachea midline; thyroid size normal, nontender, no nodules or masses present on palpation    Respiratory  Respiratory Effort  : breathing unlabored  Inspection of Chest  : normal appearance, no retractions    Cardiovascular  Heart  : regular rate and rhythm    Lymphatic  Neck  : no lymphadenopathy present  Supraclavicular Nodes  : no lymphadenopathy present  Preauricular Nodes  : no lymphadenopathy present    Skin and Subcutaneous Tissue  General Inspection  : Regarding face and neck - there are no rashes present, no lesions present, and no areas of discoloration    Neurologic  Cranial Nerves  : cranial nerves II-XII are grossly intact  bilaterally  Gait and Station  : normal gait, able to stand without diffculty    Psychiatric  Judgement and Insight  : judgment and insight intact  Mood and Affect  : mood normal, affect appropriate              Assessment and Plan    Diagnoses and all orders for this visit:    1. Meniere's disease of left ear (Primary)  -     Comprehensive Hearing Test; Future  -     Tympanometry; Future    2. Sensorineural hearing loss (SNHL) of left ear with unrestricted hearing of right ear    3. Vertigo  -     Comprehensive Hearing Test; Future  -     Tympanometry; Future    Ear exam today was completely normal.  I discussed his audiogram results which are described above.  Based on his symptoms and audio I do believe he has Ménière's disease in the left ear and discussed this with him.  There is also possibility of a hereditary condition as he notes that there are several family members that have similar issues with her left ear.  He did not have any dizzy spells until April May and informs me that he has had hearing loss in the left ear for quite some time prior to this.  We did discuss Dyazide as an option if he is not well controlled on low-salt diet alone.  I discussed expectation with Ménière's disease.  I will plan to see him back in the clinic in 1 year with repeat audiogram.    Follow Up   No follow-ups on file.  Patient was given instructions and counseling regarding his condition or for health maintenance advice. Please see specific information pulled into the AVS if appropriate.

## 2023-10-31 NOTE — PROGRESS NOTES
Patient Name: Chase Leon   Visit Date: 08/23/2023   Patient ID: [id number]  Provider: Deven Alvarado MD    Sex: male  Location: [facility]   YOB: 1975  Location Address: [address] [place] [address], [address] [place] [address],?[place]?[id number]    Primary Care Provider Hellen Montejo, APRN  Location Phone: [phone]    Referring Provider: No ref. provider found        Chief Complaint  Dizziness, FLUID BEHIND THE EAR, Establish Care, and Headache    Subjective    History of Present Illness  Chase Leon is a 47 y.o. male who presents to [facility] today as a consult from No ref. provider found.    He presents to the clinic today for evaluation of his ears and hearing.  He informs me that he has a longstanding history of decreased hearing on his left side, and has several family members that have similar issues.  In May he noted some severe dizzy spells as well as increased ear fullness, left ear ringing, and decreased hearing.  He was having a lot of trouble during the episode and finished an antibiotic for what was presumed to be an ear infection.  He notes that he is better now in terms of some of the symptoms, but continues to have ear fullness, mild ringing, and hearing loss seems to be worse on the left.  He does get his hearing tested at work, but has not had a formal audiogram.    Past Medical History:   Diagnosis Date    Anxiety     Essential (primary) hypertension     Low back pain     Mixed hyperlipidemia     Nicotine dependence     Screening for depression     Vitamin D deficiency        Past Surgical History:   Procedure Laterality Date    VASECTOMY  2006         Current Outpatient Medications:     escitalopram (LEXAPRO) 20 MG tablet, Take 1 tablet by mouth Daily., Disp: 90 tablet, Rfl: 1    fenofibrate (FENOGLIDE) 120 MG tablet, TAKE 1 TABLET ONCE DAILY, Disp: 90 tablet, Rfl: 0    fluticasone (FLONASE) 50 MCG/ACT nasal spray, 1 spray  "into the nostril(s) as directed by provider Daily. (Patient not taking: Reported on 10/31/2023), Disp: 18 g, Rfl: 3    meclizine (ANTIVERT) 25 MG tablet, Take 1 tablet by mouth 3 (Three) Times a Day As Needed for Dizziness. (Patient not taking: Reported on 10/31/2023), Disp: 30 tablet, Rfl: 0    meloxicam (MOBIC) 15 MG tablet, Take 1 tablet by mouth Daily., Disp: 90 tablet, Rfl: 1    traMADol (ULTRAM) 50 MG tablet, Take 1 tablet by mouth Every 8 (Eight) Hours As Needed., Disp: , Rfl:     amLODIPine (NORVASC) 5 MG tablet, TAKE 1 TABLET DAILY, Disp: 30 tablet, Rfl: 1    atorvastatin (LIPITOR) 10 MG tablet, Take 1 tablet by mouth Daily., Disp: 90 tablet, Rfl: 1    cyclobenzaprine (FLEXERIL) 10 MG tablet, , Disp: , Rfl:     lisinopril-hydrochlorothiazide (PRINZIDE,ZESTORETIC) 20-25 MG per tablet, TAKE 1 TABLET DAILY, Disp: 90 tablet, Rfl: 1     No Known Allergies    Family History   Problem Relation Age of Onset    Suicidality Father         2012    Alzheimer's disease Maternal Grandmother     Heart attack Maternal Grandfather     Stroke Paternal Grandmother     Stroke Paternal Grandfather         Social History     Social History Narrative    Not on file       Objective     Vital Signs:   Resp 20   Ht 177.8 cm (70\")   Wt 100 kg (221 lb)   BMI 31.71 kg/m²       Physical Exam    Constitutional   Appearance  : well developed, well-nourished, alert and in no acute distress, voice clear and strong    Head  Inspection  : no deformities or lesions  Face  Inspection  : No facial lesions; House-Brackmann I/VI bilaterally  Palpation  : No TMJ crepitus nor  muscle tenderness bilaterally    Eyes  Vision  Visual Fields  : Extraocular movements are intact. No spontaneous or gaze-induced nystagmus.  Conjunctivae  : clear  Sclerae  : clear  Pupils and Irises  : pupils equal, round, and reactive to light.     Ears, Nose, Mouth and Throat    Ears    External Ears  : appearance within normal limits, no lesions " present  Otoscopic Examination  : Tympanic membrane appearance within normal limits bilaterally without perforations, well-aerated middle ears  Hearing  : intact to conversational voice both ears  Tunning fork testing:     :    Nose    External Nose  : appearance normal  Intranasal Exam  : mucosa within normal limits, vestibules normal, no intranasal lesions present, septum midline, sinuses non tender to percussion  Oral Cavity    Oral Mucosa  : oral mucosa normal without pallor or cyanosis  Lips  : lip appearance normal  Teeth  : normal dentition for age  Gums  : gums pink, non-swollen, no bleeding present  Tongue  : tongue appearance normal; normal mobility  Palate  : hard palate normal, soft palate appearance normal with symmetric mobility    Throat    Oropharynx  : no inflammation or lesions present, tonsils within normal limits  Hypopharynx  : appearance within normal limits, superior epiglottis within normal limits  Larynx  : appearance within normal limits, vocal cords within normal limits, no lesions present    Neck  Inspection/Palpation  : normal appearance, no masses or tenderness, trachea midline; thyroid size normal, nontender, no nodules or masses present on palpation    Respiratory  Respiratory Effort  : breathing unlabored  Inspection of Chest  : normal appearance, no retractions    Cardiovascular  Heart  : regular rate and rhythm    Lymphatic  Neck  : no lymphadenopathy present  Supraclavicular Nodes  : no lymphadenopathy present  Preauricular Nodes  : no lymphadenopathy present    Skin and Subcutaneous Tissue  General Inspection  : Regarding face and neck - there are no rashes present, no lesions present, and no areas of discoloration    Neurologic  Cranial Nerves  : cranial nerves II-XII are grossly intact bilaterally  Gait and Station  : normal gait, able to stand without diffculty    Psychiatric  Judgement and Insight  : judgment and insight intact  Mood and Affect  : mood normal, affect  appropriate              Assessment and Plan    Diagnoses and all orders for this visit:    1. Sensorineural hearing loss (SNHL) of left ear with unrestricted hearing of right ear (Primary)    2. Vertigo    3. Meniere's disease of left ear    4. Tobacco abuse    Examination today revealed a completely normal ear exam.  Some of his symptoms are consistent with Ménière's disease or perhaps some kind of a familial hereditary hearing loss.  He is not exhibiting any issues with his balance and I discussed Ménière's disease with him.  I will plan on obtaining an audiogram and tympanogram and seeing him back in the clinic for reevaluation.  I did advise him to maintain a low-salt diet.  He is on lisinopril for hypertension and this seems to control his blood pressure well.  We discussed smoking cessation and he will try to cut down with a goal to quit.  I will plan on seeing him back in the clinic after the hearing test results to discuss further management.    Follow Up   No follow-ups on file.  Patient was given instructions and counseling regarding his condition or for health maintenance advice. Please see specific information pulled into the AVS if appropriate.

## 2023-12-06 DIAGNOSIS — I10 ESSENTIAL (PRIMARY) HYPERTENSION: ICD-10-CM

## 2023-12-06 RX ORDER — AMLODIPINE BESYLATE 5 MG/1
TABLET ORAL
Qty: 30 TABLET | Refills: 3 | Status: SHIPPED | OUTPATIENT
Start: 2023-12-06

## 2023-12-12 DIAGNOSIS — I10 ESSENTIAL (PRIMARY) HYPERTENSION: ICD-10-CM

## 2023-12-12 RX ORDER — AMLODIPINE BESYLATE 5 MG/1
5 TABLET ORAL DAILY
Qty: 90 TABLET | Refills: 0 | Status: SHIPPED | OUTPATIENT
Start: 2023-12-12

## 2023-12-22 DIAGNOSIS — G89.29 CHRONIC LOW BACK PAIN, UNSPECIFIED BACK PAIN LATERALITY, UNSPECIFIED WHETHER SCIATICA PRESENT: ICD-10-CM

## 2023-12-22 DIAGNOSIS — M54.50 CHRONIC LOW BACK PAIN, UNSPECIFIED BACK PAIN LATERALITY, UNSPECIFIED WHETHER SCIATICA PRESENT: ICD-10-CM

## 2023-12-22 RX ORDER — MELOXICAM 15 MG/1
15 TABLET ORAL DAILY
Qty: 90 TABLET | Refills: 0 | Status: SHIPPED | OUTPATIENT
Start: 2023-12-22

## 2024-03-01 ENCOUNTER — TELEPHONE (OUTPATIENT)
Dept: FAMILY MEDICINE CLINIC | Age: 49
End: 2024-03-01
Payer: COMMERCIAL

## 2024-03-01 NOTE — TELEPHONE ENCOUNTER
Pt dropped off [place] papers and paid the 20$ fee. Papers scanned in and placed in [place]'s mailbox.

## 2024-03-04 NOTE — PROGRESS NOTES
"Chief Complaint  Annual Exam ([place] paperwork. /Pt would like to discuss weaning off meloxicam and lexapro. )    Subjective          Chase Leon presents to [facility]  History of Present Illness  He is here for physical.    He has had 2 doses of the COVID-vaccine and is not interested in the booster.  He is unsure if his tetanus is UTD; he declines booster.  His colonoscopy was in 2022. He hasn't been to the dentist in years. He does wear his seat belt. He does try to eat healthy. He is drinking 5 bottles of water daily.    HTN: He is taking amlodipine 5 mg daily and lisinopril/HCTZ 20/25 mg daily. BP is high in office today, which he attributes to his back pain.    HLD: His last lipid panel from September showed his cholesterol 256, , triglycerides 139.  It was recommended to start taking atorvastatin after that blood draw.     Prediabetes: His last A1c was normal in September.    Anxiety: He is taking Lexapro 20 mg daily. He has been on the medication for 12 years. He feels that he is in a pretty good place mentally.     Ménière's: He does follow with ENT.    Chronic back pain: He follows with pain management and is currently taking tramadol and meloxicam. He would like to come off his meloxicam. He does report having muscle aches.       Objective   Vital Signs:   /96   Pulse 83   Ht 177.8 cm (70\")   Wt 98.9 kg (218 lb)   SpO2 95% Comment: room air  BMI 31.28 kg/m²     Physical Exam  Constitutional:       General: He is not in acute distress.     Appearance: Normal appearance. He is well-developed. He is obese.   HENT:      Head: Normocephalic.      Right Ear: Tympanic membrane, ear canal and external ear normal.      Left Ear: Tympanic membrane, ear canal and external ear normal.      Mouth/Throat:      Mouth: Mucous membranes are moist.      Pharynx: Oropharynx is clear. No oropharyngeal exudate or posterior oropharyngeal erythema.   Eyes:      Extraocular " Movements: Extraocular movements intact.      Conjunctiva/sclera: Conjunctivae normal.      Pupils: Pupils are equal, round, and reactive to light.      Visual Fields: Right eye visual fields normal and left eye visual fields normal.   Neck:      Thyroid: No thyromegaly or thyroid tenderness.   Cardiovascular:      Rate and Rhythm: Normal rate and regular rhythm.      Heart sounds: Normal heart sounds.   Pulmonary:      Effort: Pulmonary effort is normal. No retractions.      Breath sounds: Normal breath sounds and air entry.   Abdominal:      General: Abdomen is flat. Bowel sounds are normal. There is no distension.      Palpations: Abdomen is soft. There is no mass.      Tenderness: There is no abdominal tenderness.   Musculoskeletal:         General: Normal range of motion.      Cervical back: Normal range of motion and neck supple.      Right lower leg: No edema.      Left lower leg: No edema.   Lymphadenopathy:      Cervical: No cervical adenopathy.   Skin:     General: Skin is warm and dry.   Neurological:      Mental Status: He is alert and oriented to person, place, and time.   Psychiatric:         Mood and Affect: Mood and affect normal.         Behavior: Behavior normal.         Thought Content: Thought content normal.        Result Review :   The following data was reviewed by: MICHAEL Carpio on 03/12/2024:                  Assessment and Plan    Diagnoses and all orders for this visit:    1. Annual physical exam (Primary)  -     CBC (No Diff); Future  -     Comprehensive Metabolic Panel; Future  -     Lipid Panel; Future  -     Hemoglobin A1c; Future  -     TSH; Future    2. Essential (primary) hypertension  Assessment & Plan:  BP is elevated in office today, which he attributes to back pain.  He is to continue his amlodipine 5 mg daily and lisinopril/HCTZ 20/25 mg daily.      3. Other hyperlipidemia  Assessment & Plan:   He is unsure if his muscle aches are due to the statin or due to his back  pain.  Will check a fasting lipid panel.  He is currently taking atorvastatin 10 mg daily.    Orders:  -     atorvastatin (LIPITOR) 10 MG tablet; Take 1 tablet by mouth Daily.  Dispense: 90 tablet; Refill: 1    4. Meniere's disease of left ear  Assessment & Plan:  Continue follow-up with ENT.      5. Prediabetes  Assessment & Plan:  Will check an A1c.  Continue lifestyle modifications.      6. Anxiety  Assessment & Plan:  He would like to come off his Lexapro, as he feels he is in a better place than he was when he started the medication 12 years ago.  Will have him take 10 mg daily for 4 weeks, 5 mg daily for 4 weeks, and then 5 mg every other day for 4 weeks and he may stop.  He will let me know if he needs to restart the medication.      7. Chronic low back pain, unspecified back pain laterality, unspecified whether sciatica present  Assessment & Plan:  He is following with pain management.  Weight discussed that he can come off meloxicam if he desires.      8. Preventative health care  -     CBC (No Diff); Future  -     Comprehensive Metabolic Panel; Future  -     Lipid Panel; Future  -     Hemoglobin A1c; Future  -     TSH; Future    9. Screening for thyroid disorder  -     TSH; Future    Preventative measures discussed. Declines tetanus and COVID vaccines at this time.       Follow Up   Return in about 6 months (around 9/12/2024).  Patient was given instructions and counseling regarding his condition or for health maintenance advice. Please see specific information pulled into the AVS if appropriate.

## 2024-03-12 ENCOUNTER — OFFICE VISIT (OUTPATIENT)
Dept: FAMILY MEDICINE CLINIC | Age: 49
End: 2024-03-12
Payer: COMMERCIAL

## 2024-03-12 VITALS
SYSTOLIC BLOOD PRESSURE: 150 MMHG | WEIGHT: 218 LBS | DIASTOLIC BLOOD PRESSURE: 96 MMHG | HEART RATE: 83 BPM | OXYGEN SATURATION: 95 % | HEIGHT: 70 IN | BODY MASS INDEX: 31.21 KG/M2

## 2024-03-12 DIAGNOSIS — G89.29 CHRONIC LOW BACK PAIN, UNSPECIFIED BACK PAIN LATERALITY, UNSPECIFIED WHETHER SCIATICA PRESENT: ICD-10-CM

## 2024-03-12 DIAGNOSIS — F41.9 ANXIETY: ICD-10-CM

## 2024-03-12 DIAGNOSIS — H81.02 MENIERE'S DISEASE OF LEFT EAR: ICD-10-CM

## 2024-03-12 DIAGNOSIS — E78.49 OTHER HYPERLIPIDEMIA: ICD-10-CM

## 2024-03-12 DIAGNOSIS — Z13.29 SCREENING FOR THYROID DISORDER: ICD-10-CM

## 2024-03-12 DIAGNOSIS — R73.03 PREDIABETES: ICD-10-CM

## 2024-03-12 DIAGNOSIS — I10 ESSENTIAL (PRIMARY) HYPERTENSION: ICD-10-CM

## 2024-03-12 DIAGNOSIS — Z00.00 ANNUAL PHYSICAL EXAM: Primary | ICD-10-CM

## 2024-03-12 DIAGNOSIS — M54.50 CHRONIC LOW BACK PAIN, UNSPECIFIED BACK PAIN LATERALITY, UNSPECIFIED WHETHER SCIATICA PRESENT: ICD-10-CM

## 2024-03-12 DIAGNOSIS — Z00.00 PREVENTATIVE HEALTH CARE: ICD-10-CM

## 2024-03-12 RX ORDER — ATORVASTATIN CALCIUM 10 MG/1
10 TABLET, FILM COATED ORAL DAILY
Qty: 90 TABLET | Refills: 1 | Status: SHIPPED | OUTPATIENT
Start: 2024-03-12

## 2024-03-12 NOTE — ASSESSMENT & PLAN NOTE
He is following with pain management.  Weight discussed that he can come off meloxicam if he desires.

## 2024-03-12 NOTE — ASSESSMENT & PLAN NOTE
BP is elevated in office today, which he attributes to back pain.  He is to continue his amlodipine 5 mg daily and lisinopril/HCTZ 20/25 mg daily.

## 2024-03-12 NOTE — ASSESSMENT & PLAN NOTE
He is unsure if his muscle aches are due to the statin or due to his back pain.  Will check a fasting lipid panel.  He is currently taking atorvastatin 10 mg daily.

## 2024-03-12 NOTE — ASSESSMENT & PLAN NOTE
He would like to come off his Lexapro, as he feels he is in a better place than he was when he started the medication 12 years ago.  Will have him take 10 mg daily for 4 weeks, 5 mg daily for 4 weeks, and then 5 mg every other day for 4 weeks and he may stop.  He will let me know if he needs to restart the medication.

## 2024-03-19 ENCOUNTER — LAB (OUTPATIENT)
Dept: LAB | Facility: HOSPITAL | Age: 49
End: 2024-03-19
Payer: COMMERCIAL

## 2024-03-19 DIAGNOSIS — Z00.00 ANNUAL PHYSICAL EXAM: ICD-10-CM

## 2024-03-19 DIAGNOSIS — Z13.29 SCREENING FOR THYROID DISORDER: ICD-10-CM

## 2024-03-19 DIAGNOSIS — Z00.00 PREVENTATIVE HEALTH CARE: ICD-10-CM

## 2024-03-19 LAB
ALBUMIN SERPL-MCNC: 4.7 G/DL (ref 3.5–5.2)
ALBUMIN/GLOB SERPL: 2.2 G/DL
ALP SERPL-CCNC: 54 U/L (ref 39–117)
ALT SERPL W P-5'-P-CCNC: 30 U/L (ref 1–41)
ANION GAP SERPL CALCULATED.3IONS-SCNC: 14.4 MMOL/L (ref 5–15)
AST SERPL-CCNC: 22 U/L (ref 1–40)
BILIRUB SERPL-MCNC: 0.5 MG/DL (ref 0–1.2)
BUN SERPL-MCNC: 12 MG/DL (ref 6–20)
BUN/CREAT SERPL: 13.3 (ref 7–25)
CALCIUM SPEC-SCNC: 9 MG/DL (ref 8.6–10.5)
CHLORIDE SERPL-SCNC: 100 MMOL/L (ref 98–107)
CHOLEST SERPL-MCNC: 159 MG/DL (ref 0–200)
CO2 SERPL-SCNC: 24.6 MMOL/L (ref 22–29)
CREAT SERPL-MCNC: 0.9 MG/DL (ref 0.76–1.27)
DEPRECATED RDW RBC AUTO: 44.4 FL (ref 37–54)
EGFRCR SERPLBLD CKD-EPI 2021: 106 ML/MIN/1.73
ERYTHROCYTE [DISTWIDTH] IN BLOOD BY AUTOMATED COUNT: 13 % (ref 12.3–15.4)
GLOBULIN UR ELPH-MCNC: 2.1 GM/DL
GLUCOSE SERPL-MCNC: 82 MG/DL (ref 65–99)
HBA1C MFR BLD: 5.7 % (ref 4.8–5.6)
HCT VFR BLD AUTO: 43.9 % (ref 37.5–51)
HDLC SERPL-MCNC: 68 MG/DL (ref 40–60)
HGB BLD-MCNC: 14.7 G/DL (ref 13–17.7)
LDLC SERPL CALC-MCNC: 78 MG/DL (ref 0–100)
LDLC/HDLC SERPL: 1.15 {RATIO}
MCH RBC QN AUTO: 30.5 PG (ref 26.6–33)
MCHC RBC AUTO-ENTMCNC: 33.5 G/DL (ref 31.5–35.7)
MCV RBC AUTO: 91.1 FL (ref 79–97)
PLATELET # BLD AUTO: 263 10*3/MM3 (ref 140–450)
PMV BLD AUTO: 9.7 FL (ref 6–12)
POTASSIUM SERPL-SCNC: 3.4 MMOL/L (ref 3.5–5.2)
PROT SERPL-MCNC: 6.8 G/DL (ref 6–8.5)
RBC # BLD AUTO: 4.82 10*6/MM3 (ref 4.14–5.8)
SODIUM SERPL-SCNC: 139 MMOL/L (ref 136–145)
TRIGL SERPL-MCNC: 63 MG/DL (ref 0–150)
TSH SERPL DL<=0.05 MIU/L-ACNC: 2.9 UIU/ML (ref 0.27–4.2)
VLDLC SERPL-MCNC: 13 MG/DL (ref 5–40)
WBC NRBC COR # BLD AUTO: 7.14 10*3/MM3 (ref 3.4–10.8)

## 2024-03-19 PROCEDURE — 36415 COLL VENOUS BLD VENIPUNCTURE: CPT

## 2024-03-19 PROCEDURE — 83036 HEMOGLOBIN GLYCOSYLATED A1C: CPT

## 2024-03-19 PROCEDURE — 80050 GENERAL HEALTH PANEL: CPT

## 2024-03-19 PROCEDURE — 80061 LIPID PANEL: CPT

## 2024-08-01 ENCOUNTER — TELEPHONE (OUTPATIENT)
Dept: OTOLARYNGOLOGY | Facility: CLINIC | Age: 49
End: 2024-08-01
Payer: COMMERCIAL

## 2024-08-20 PROBLEM — F41.9 ANXIETY: Status: RESOLVED | Noted: 2022-03-28 | Resolved: 2024-08-20

## 2024-08-20 PROBLEM — E78.2 MIXED HYPERLIPIDEMIA: Status: ACTIVE | Noted: 2023-07-26

## 2024-08-20 PROBLEM — F41.1 GENERALIZED ANXIETY DISORDER: Status: ACTIVE | Noted: 2024-08-20

## 2024-08-20 NOTE — PROGRESS NOTES
"Chief Complaint  Hypertension (6 months)    Subjective          Chase Leon presents to [facility]  History of Present Illness  HTN: He is taking amlodipine 5 mg daily and lisinopril/HCTZ 20/25 mg daily. His BP is elevated. He reports that he is in a lot of back pain currently. He denies chest pain, blurred vision, H/A, and pedal.      HLD: He is taking atorvastatin 10 mg daily.  His last lipid panel from March showed his cholesterol 159, triglycerides 63, and LDL 78.    Prediabetes: His last A1c was 5.7 in March. He had lost some weight, but he has gained some of it back.    Anxiety: he was on Lexapro in the past. He feels good for the most part, but sometimes he finds himself getting irritated.     Chronic back pain: He does follow with pain management. He is taking tramadol.       Objective   Vital Signs:   /83 (BP Location: Left arm, Patient Position: Sitting)   Pulse 100   Ht 177.8 cm (70\")   Wt 99.8 kg (220 lb)   BMI 31.57 kg/m²     Physical Exam  Constitutional:       General: He is not in acute distress.     Appearance: Normal appearance. He is obese.   HENT:      Head: Normocephalic.   Eyes:      Pupils: Pupils are equal, round, and reactive to light.      Visual Fields: Right eye visual fields normal and left eye visual fields normal.   Neck:      Trachea: Trachea normal.   Cardiovascular:      Rate and Rhythm: Normal rate and regular rhythm.      Heart sounds: Normal heart sounds.   Pulmonary:      Effort: Pulmonary effort is normal.      Breath sounds: Normal breath sounds and air entry.   Musculoskeletal:      Right lower leg: No edema.      Left lower leg: No edema.   Skin:     General: Skin is warm and dry.   Neurological:      Mental Status: He is alert and oriented to person, place, and time.   Psychiatric:         Mood and Affect: Mood and affect normal.         Behavior: Behavior normal.         Thought Content: Thought content normal.        Result " Review :   The following data was reviewed by: MICHAEL Carpio on 08/27/2024:                  Assessment and Plan    Diagnoses and all orders for this visit:    1. Essential (primary) hypertension (Primary)  Assessment & Plan:  His blood pressure is elevated in office today, but he reports is due to his back pain.  He reports he had a bad flare at work yesterday.  He is currently taking amlodipine 5 mg daily and lisinopril HCTZ 20/25 mg daily.    Orders:  -     CBC (No Diff); Future  -     Comprehensive Metabolic Panel; Future  -     Lipid Panel; Future    2. Prediabetes  Assessment & Plan:  Continue working on lifestyle modifications.  Will check an A1c.    Orders:  -     CBC (No Diff); Future  -     Comprehensive Metabolic Panel; Future  -     Lipid Panel; Future  -     Hemoglobin A1c; Future    3. Generalized anxiety disorder    4. Chronic low back pain, unspecified back pain laterality, unspecified whether sciatica present  Assessment & Plan:  Continue follow-up with pain management.      5. Mixed hyperlipidemia  Assessment & Plan:  He is currently taking atorvastatin 10 mg daily.  Will check a fasting lipid panel.    Orders:  -     CBC (No Diff); Future  -     Comprehensive Metabolic Panel; Future  -     Lipid Panel; Future  -     atorvastatin (LIPITOR) 10 MG tablet; Take 1 tablet by mouth Daily.  Dispense: 90 tablet; Refill: 1    6. Obesity (BMI 30-39.9)  Assessment & Plan:  Patient's (Body mass index is 31.57 kg/m².) indicates that they are obese (BMI >30) with health conditions that include hypertension and prediabetes  . Weight is unchanged. BMI  is above average; BMI management plan is completed. We discussed portion control and increasing exercise.             Follow Up   Return in about 7 months (around 3/13/2025) for Annual physical.  Patient was given instructions and counseling regarding his condition or for health maintenance advice. Please see specific information pulled into the AVS if  appropriate.

## 2024-08-27 ENCOUNTER — TELEPHONE (OUTPATIENT)
Dept: OTOLARYNGOLOGY | Facility: CLINIC | Age: 49
End: 2024-08-27
Payer: COMMERCIAL

## 2024-08-27 ENCOUNTER — OFFICE VISIT (OUTPATIENT)
Dept: FAMILY MEDICINE CLINIC | Age: 49
End: 2024-08-27
Payer: COMMERCIAL

## 2024-08-27 ENCOUNTER — TELEPHONE (OUTPATIENT)
Dept: FAMILY MEDICINE CLINIC | Age: 49
End: 2024-08-27

## 2024-08-27 VITALS
DIASTOLIC BLOOD PRESSURE: 83 MMHG | WEIGHT: 220 LBS | SYSTOLIC BLOOD PRESSURE: 139 MMHG | HEIGHT: 70 IN | BODY MASS INDEX: 31.5 KG/M2 | HEART RATE: 100 BPM

## 2024-08-27 DIAGNOSIS — F41.1 GENERALIZED ANXIETY DISORDER: ICD-10-CM

## 2024-08-27 DIAGNOSIS — E66.9 OBESITY (BMI 30-39.9): ICD-10-CM

## 2024-08-27 DIAGNOSIS — G89.29 CHRONIC LOW BACK PAIN, UNSPECIFIED BACK PAIN LATERALITY, UNSPECIFIED WHETHER SCIATICA PRESENT: ICD-10-CM

## 2024-08-27 DIAGNOSIS — R73.03 PREDIABETES: ICD-10-CM

## 2024-08-27 DIAGNOSIS — I10 ESSENTIAL (PRIMARY) HYPERTENSION: Primary | ICD-10-CM

## 2024-08-27 DIAGNOSIS — E78.2 MIXED HYPERLIPIDEMIA: ICD-10-CM

## 2024-08-27 DIAGNOSIS — M54.50 CHRONIC LOW BACK PAIN, UNSPECIFIED BACK PAIN LATERALITY, UNSPECIFIED WHETHER SCIATICA PRESENT: ICD-10-CM

## 2024-08-27 PROCEDURE — 99214 OFFICE O/P EST MOD 30 MIN: CPT | Performed by: NURSE PRACTITIONER

## 2024-08-27 RX ORDER — ATORVASTATIN CALCIUM 10 MG/1
10 TABLET, FILM COATED ORAL DAILY
Qty: 90 TABLET | Refills: 1 | Status: SHIPPED | OUTPATIENT
Start: 2024-08-27 | End: 2024-08-28 | Stop reason: SDUPTHER

## 2024-08-27 NOTE — TELEPHONE ENCOUNTER
Pt dropped off [facility] paperwork to be completed. The $20 fee was collected. Paperwork was attached to this encounter and placed in [place]'s mailbox.

## 2024-08-27 NOTE — ASSESSMENT & PLAN NOTE
Patient's (Body mass index is 31.57 kg/m².) indicates that they are obese (BMI >30) with health conditions that include hypertension and prediabetes  . Weight is unchanged. BMI  is above average; BMI management plan is completed. We discussed portion control and increasing exercise.

## 2024-08-27 NOTE — TELEPHONE ENCOUNTER
Patient number not set up for messages. Appointment was cancelled. Mailed letter to patient come back return to sender

## 2024-08-27 NOTE — ASSESSMENT & PLAN NOTE
His blood pressure is elevated in office today, but he reports is due to his back pain.  He reports he had a bad flare at work yesterday.  He is currently taking amlodipine 5 mg daily and lisinopril HCTZ 20/25 mg daily.

## 2024-08-28 DIAGNOSIS — E78.2 MIXED HYPERLIPIDEMIA: ICD-10-CM

## 2024-08-28 RX ORDER — ATORVASTATIN CALCIUM 10 MG/1
10 TABLET, FILM COATED ORAL DAILY
Qty: 90 TABLET | Refills: 1 | Status: SHIPPED | OUTPATIENT
Start: 2024-08-28

## 2024-09-12 ENCOUNTER — LAB (OUTPATIENT)
Dept: LAB | Facility: HOSPITAL | Age: 49
End: 2024-09-12
Payer: COMMERCIAL

## 2024-09-12 DIAGNOSIS — E78.2 MIXED HYPERLIPIDEMIA: ICD-10-CM

## 2024-09-12 DIAGNOSIS — R73.03 PREDIABETES: ICD-10-CM

## 2024-09-12 DIAGNOSIS — I10 ESSENTIAL (PRIMARY) HYPERTENSION: ICD-10-CM

## 2024-09-12 LAB
ALBUMIN SERPL-MCNC: 5.1 G/DL (ref 3.5–5.2)
ALBUMIN/GLOB SERPL: 2.2 G/DL
ALP SERPL-CCNC: 63 U/L (ref 39–117)
ALT SERPL W P-5'-P-CCNC: 30 U/L (ref 1–41)
ANION GAP SERPL CALCULATED.3IONS-SCNC: 14.6 MMOL/L (ref 5–15)
AST SERPL-CCNC: 22 U/L (ref 1–40)
BILIRUB SERPL-MCNC: 0.4 MG/DL (ref 0–1.2)
BUN SERPL-MCNC: 22 MG/DL (ref 6–20)
BUN/CREAT SERPL: 19.8 (ref 7–25)
CALCIUM SPEC-SCNC: 9.7 MG/DL (ref 8.6–10.5)
CHLORIDE SERPL-SCNC: 103 MMOL/L (ref 98–107)
CHOLEST SERPL-MCNC: 197 MG/DL (ref 0–200)
CO2 SERPL-SCNC: 24.4 MMOL/L (ref 22–29)
CREAT SERPL-MCNC: 1.11 MG/DL (ref 0.76–1.27)
DEPRECATED RDW RBC AUTO: 43.9 FL (ref 37–54)
EGFRCR SERPLBLD CKD-EPI 2021: 82.4 ML/MIN/1.73
ERYTHROCYTE [DISTWIDTH] IN BLOOD BY AUTOMATED COUNT: 13 % (ref 12.3–15.4)
GLOBULIN UR ELPH-MCNC: 2.3 GM/DL
GLUCOSE SERPL-MCNC: 74 MG/DL (ref 65–99)
HBA1C MFR BLD: 5.5 % (ref 4.8–5.6)
HCT VFR BLD AUTO: 48.6 % (ref 37.5–51)
HDLC SERPL-MCNC: 64 MG/DL (ref 40–60)
HGB BLD-MCNC: 15.7 G/DL (ref 13–17.7)
LDLC SERPL CALC-MCNC: 110 MG/DL (ref 0–100)
LDLC/HDLC SERPL: 1.68 {RATIO}
MCH RBC QN AUTO: 29.7 PG (ref 26.6–33)
MCHC RBC AUTO-ENTMCNC: 32.3 G/DL (ref 31.5–35.7)
MCV RBC AUTO: 91.9 FL (ref 79–97)
PLATELET # BLD AUTO: 299 10*3/MM3 (ref 140–450)
PMV BLD AUTO: 10 FL (ref 6–12)
POTASSIUM SERPL-SCNC: 3.2 MMOL/L (ref 3.5–5.2)
PROT SERPL-MCNC: 7.4 G/DL (ref 6–8.5)
RBC # BLD AUTO: 5.29 10*6/MM3 (ref 4.14–5.8)
SODIUM SERPL-SCNC: 142 MMOL/L (ref 136–145)
TRIGL SERPL-MCNC: 129 MG/DL (ref 0–150)
VLDLC SERPL-MCNC: 23 MG/DL (ref 5–40)
WBC NRBC COR # BLD AUTO: 6.84 10*3/MM3 (ref 3.4–10.8)

## 2024-09-12 PROCEDURE — 36415 COLL VENOUS BLD VENIPUNCTURE: CPT

## 2024-09-12 PROCEDURE — 80061 LIPID PANEL: CPT

## 2024-09-12 PROCEDURE — 85027 COMPLETE CBC AUTOMATED: CPT

## 2024-09-12 PROCEDURE — 80053 COMPREHEN METABOLIC PANEL: CPT

## 2024-09-12 PROCEDURE — 83036 HEMOGLOBIN GLYCOSYLATED A1C: CPT

## 2024-09-16 DIAGNOSIS — E87.6 HYPOKALEMIA: Primary | ICD-10-CM

## 2024-09-16 RX ORDER — POTASSIUM CHLORIDE 750 MG/1
10 TABLET, EXTENDED RELEASE ORAL DAILY
Qty: 7 TABLET | Refills: 0 | Status: SHIPPED | OUTPATIENT
Start: 2024-09-16 | End: 2024-09-23

## 2024-09-16 NOTE — PROGRESS NOTES
I would like for him to take potassium for 1 week and recheck his potassium levels a few days after completing it.  I have sent prescription potassium into his pharmacy.

## 2024-09-30 ENCOUNTER — LAB (OUTPATIENT)
Dept: LAB | Facility: HOSPITAL | Age: 49
End: 2024-09-30
Payer: COMMERCIAL

## 2024-09-30 DIAGNOSIS — E87.6 HYPOKALEMIA: ICD-10-CM

## 2024-09-30 LAB
ANION GAP SERPL CALCULATED.3IONS-SCNC: 13 MMOL/L (ref 5–15)
BUN SERPL-MCNC: 18 MG/DL (ref 6–20)
BUN/CREAT SERPL: 17.1 (ref 7–25)
CALCIUM SPEC-SCNC: 9.5 MG/DL (ref 8.6–10.5)
CHLORIDE SERPL-SCNC: 105 MMOL/L (ref 98–107)
CO2 SERPL-SCNC: 27 MMOL/L (ref 22–29)
CREAT SERPL-MCNC: 1.05 MG/DL (ref 0.76–1.27)
EGFRCR SERPLBLD CKD-EPI 2021: 88.1 ML/MIN/1.73
GLUCOSE SERPL-MCNC: 79 MG/DL (ref 65–99)
POTASSIUM SERPL-SCNC: 4.1 MMOL/L (ref 3.5–5.2)
SODIUM SERPL-SCNC: 145 MMOL/L (ref 136–145)

## 2024-09-30 PROCEDURE — 80048 BASIC METABOLIC PNL TOTAL CA: CPT

## 2024-09-30 PROCEDURE — 36415 COLL VENOUS BLD VENIPUNCTURE: CPT

## 2024-10-25 ENCOUNTER — OFFICE VISIT (OUTPATIENT)
Dept: FAMILY MEDICINE CLINIC | Age: 49
End: 2024-10-25
Payer: COMMERCIAL

## 2024-10-25 ENCOUNTER — LAB (OUTPATIENT)
Dept: LAB | Facility: HOSPITAL | Age: 49
End: 2024-10-25
Payer: COMMERCIAL

## 2024-10-25 ENCOUNTER — HOSPITAL ENCOUNTER (OUTPATIENT)
Dept: GENERAL RADIOLOGY | Facility: HOSPITAL | Age: 49
Discharge: HOME | End: 2024-10-25
Payer: COMMERCIAL

## 2024-10-25 VITALS
DIASTOLIC BLOOD PRESSURE: 83 MMHG | HEIGHT: 70 IN | HEART RATE: 82 BPM | OXYGEN SATURATION: 97 % | SYSTOLIC BLOOD PRESSURE: 125 MMHG | WEIGHT: 216 LBS | BODY MASS INDEX: 30.92 KG/M2

## 2024-10-25 DIAGNOSIS — M79.674 PAIN OF TOE OF RIGHT FOOT: ICD-10-CM

## 2024-10-25 DIAGNOSIS — M25.571 CHRONIC PAIN OF RIGHT ANKLE: ICD-10-CM

## 2024-10-25 DIAGNOSIS — G89.29 CHRONIC PAIN OF RIGHT ANKLE: ICD-10-CM

## 2024-10-25 DIAGNOSIS — G89.29 CHRONIC PAIN OF RIGHT ANKLE: Primary | ICD-10-CM

## 2024-10-25 DIAGNOSIS — M25.571 CHRONIC PAIN OF RIGHT ANKLE: Primary | ICD-10-CM

## 2024-10-25 LAB
BASOPHILS # BLD AUTO: 0.04 10*3/MM3 (ref 0–0.2)
BASOPHILS NFR BLD AUTO: 0.7 % (ref 0–1.5)
CK SERPL-CCNC: 239 U/L (ref 20–200)
DEPRECATED RDW RBC AUTO: 43.8 FL (ref 37–54)
EOSINOPHIL # BLD AUTO: 0.1 10*3/MM3 (ref 0–0.4)
EOSINOPHIL NFR BLD AUTO: 1.8 % (ref 0.3–6.2)
ERYTHROCYTE [DISTWIDTH] IN BLOOD BY AUTOMATED COUNT: 12.9 % (ref 12.3–15.4)
ERYTHROCYTE [SEDIMENTATION RATE] IN BLOOD: <1 MM/HR (ref 0–15)
HCT VFR BLD AUTO: 44.7 % (ref 37.5–51)
HGB BLD-MCNC: 14.8 G/DL (ref 13–17.7)
IMM GRANULOCYTES # BLD AUTO: 0 10*3/MM3 (ref 0–0.05)
IMM GRANULOCYTES NFR BLD AUTO: 0 % (ref 0–0.5)
LYMPHOCYTES # BLD AUTO: 1.28 10*3/MM3 (ref 0.7–3.1)
LYMPHOCYTES NFR BLD AUTO: 22.8 % (ref 19.6–45.3)
MCH RBC QN AUTO: 30.3 PG (ref 26.6–33)
MCHC RBC AUTO-ENTMCNC: 33.1 G/DL (ref 31.5–35.7)
MCV RBC AUTO: 91.4 FL (ref 79–97)
MONOCYTES # BLD AUTO: 0.62 10*3/MM3 (ref 0.1–0.9)
MONOCYTES NFR BLD AUTO: 11 % (ref 5–12)
NEUTROPHILS NFR BLD AUTO: 3.58 10*3/MM3 (ref 1.7–7)
NEUTROPHILS NFR BLD AUTO: 63.7 % (ref 42.7–76)
PLATELET # BLD AUTO: 290 10*3/MM3 (ref 140–450)
PMV BLD AUTO: 9.6 FL (ref 6–12)
RBC # BLD AUTO: 4.89 10*6/MM3 (ref 4.14–5.8)
URATE SERPL-MCNC: 7.5 MG/DL (ref 3.4–7)
WBC NRBC COR # BLD AUTO: 5.62 10*3/MM3 (ref 3.4–10.8)

## 2024-10-25 PROCEDURE — 36415 COLL VENOUS BLD VENIPUNCTURE: CPT

## 2024-10-25 PROCEDURE — 85025 COMPLETE CBC W/AUTO DIFF WBC: CPT

## 2024-10-25 PROCEDURE — 82550 ASSAY OF CK (CPK): CPT

## 2024-10-25 PROCEDURE — 99214 OFFICE O/P EST MOD 30 MIN: CPT | Performed by: NURSE PRACTITIONER

## 2024-10-25 PROCEDURE — 85652 RBC SED RATE AUTOMATED: CPT

## 2024-10-25 PROCEDURE — 73600 X-RAY EXAM OF ANKLE: CPT

## 2024-10-25 PROCEDURE — 73610 X-RAY EXAM OF ANKLE: CPT

## 2024-10-25 PROCEDURE — 84550 ASSAY OF BLOOD/URIC ACID: CPT

## 2024-10-25 PROCEDURE — 73630 X-RAY EXAM OF FOOT: CPT

## 2024-10-25 RX ORDER — CYCLOBENZAPRINE HCL 10 MG
1 TABLET ORAL DAILY PRN
COMMUNITY

## 2024-10-25 NOTE — PROGRESS NOTES
"Chief Complaint  Gout (Pt c/o gout and ankle issue that is getting worse./)    Subjective        Chase Leon presents to [facility] today for possible gout, has right ankle pain, right big toe pain, swelling in his right ankle and foot at times.  States the pain is worse whenever he goes to get up in the mornings and just walking around, feels like he has to hobble to get to places and eventually throughout the day it seems to get better.  Today very little swelling, and the pain is not near as bad today.  Denies fall or injury      Current Outpatient Medications:     amLODIPine (NORVASC) 5 MG tablet, Take 1 tablet by mouth Daily., Disp: 90 tablet, Rfl: 0    atorvastatin (LIPITOR) 10 MG tablet, Take 1 tablet by mouth Daily., Disp: 90 tablet, Rfl: 1    cyclobenzaprine (FLEXERIL) 10 MG tablet, Take 1 tablet by mouth Daily As Needed., Disp: , Rfl:     lisinopril-hydrochlorothiazide (PRINZIDE,ZESTORETIC) 20-25 MG per tablet, TAKE 1 TABLET DAILY, Disp: 90 tablet, Rfl: 1    traMADol (ULTRAM) 50 MG tablet, Take 1 tablet by mouth Every 8 (Eight) Hours As Needed., Disp: , Rfl:   There are no discontinued medications.      Allergies:  Patient has no known allergies.      Objective   Vital Signs:   Vitals:    10/25/24 0849   BP: 125/83   BP Location: Right arm   Patient Position: Sitting   Pulse: 82   SpO2: 97%  Comment: RA   Weight: 98 kg (216 lb)   Height: 177.8 cm (70\")     Body mass index is 30.99 kg/m².           Physical Exam  Constitutional:       Appearance: Normal appearance.   Neck:      Vascular: No carotid bruit.   Cardiovascular:      Rate and Rhythm: Normal rate and regular rhythm.      Heart sounds: Normal heart sounds.   Pulmonary:      Effort: Pulmonary effort is normal.      Breath sounds: Normal breath sounds.   Musculoskeletal:         General: Normal range of motion.      Comments: Right big toe slight tenderness, right medial foot tenderness, full range of motion pulses " within normal limits, ankle tenderness right   Skin:     General: Skin is warm and dry.   Neurological:      General: No focal deficit present.      Mental Status: He is alert.   Psychiatric:         Mood and Affect: Mood normal.         Behavior: Behavior normal.             Lab Results   Component Value Date    GLUCOSE 79 09/30/2024    BUN 18 09/30/2024    CREATININE 1.05 09/30/2024    EGFRIFNONA 74 10/11/2021    BCR 17.1 09/30/2024    K 4.1 09/30/2024    CO2 27.0 09/30/2024    CALCIUM 9.5 09/30/2024    ALBUMIN 5.1 09/12/2024    AST 22 09/12/2024    ALT 30 09/12/2024       Lab Results   Component Value Date    CHOL 197 09/12/2024    TRIG 129 09/12/2024    HDL 64 (H) 09/12/2024     (H) 09/12/2024       Lab Results   Component Value Date    WBC 6.84 09/12/2024    HGB 15.7 09/12/2024    HCT 48.6 09/12/2024    MCV 91.9 09/12/2024     09/12/2024           Procedures         Diagnoses and all orders for this visit:    1. Chronic pain of right ankle (Primary)  -     XR Ankle 2 View Right; Future  -     CK; Future  -     Sedimentation rate, automated; Future  -     Uric acid; Future  -     CBC & Differential; Future    2. Pain of toe of right foot  -     XR Foot 3+ View Right; Future            Follow Up  Return if symptoms worsen or fail to improve, for will call with lab results, will call with imaging results , next steps.  Patient was given instructions and counseling regarding his condition or for health maintenance advice. Please see specific information pulled into the AVS if appropriate.     Discussed possible outcomes, and possible need for medications depending on x-ray and lab results.  Declines flu vaccine today.    Sammy Denton, APRN  10/25/2024    Please note that portions of this document were completed using a voice recognition program.

## 2024-10-29 NOTE — PROGRESS NOTES
CK was elevated, uric acid 7.5, sed rate negative CBC is normal.  Consistent with gout, see how he is feeling with a new medication that was given at office visit.

## 2024-10-30 DIAGNOSIS — G89.29 CHRONIC PAIN OF RIGHT ANKLE: Primary | ICD-10-CM

## 2024-10-30 DIAGNOSIS — M25.571 CHRONIC PAIN OF RIGHT ANKLE: Primary | ICD-10-CM

## 2024-10-30 RX ORDER — DICLOFENAC SODIUM 75 MG/1
75 TABLET, DELAYED RELEASE ORAL 2 TIMES DAILY PRN
Qty: 60 TABLET | Refills: 1 | Status: SHIPPED | OUTPATIENT
Start: 2024-10-30

## 2024-12-20 DIAGNOSIS — I10 ESSENTIAL (PRIMARY) HYPERTENSION: ICD-10-CM

## 2024-12-20 RX ORDER — LISINOPRIL AND HYDROCHLOROTHIAZIDE 20; 25 MG/1; MG/1
1 TABLET ORAL DAILY
Qty: 90 TABLET | Refills: 1 | Status: SHIPPED | OUTPATIENT
Start: 2024-12-20

## 2025-01-14 DIAGNOSIS — I10 ESSENTIAL (PRIMARY) HYPERTENSION: ICD-10-CM

## 2025-01-14 RX ORDER — AMLODIPINE BESYLATE 5 MG/1
5 TABLET ORAL DAILY
Qty: 90 TABLET | Refills: 0 | Status: SHIPPED | OUTPATIENT
Start: 2025-01-14

## 2025-01-14 NOTE — TELEPHONE ENCOUNTER
Pt stated he just ran out of it, he has had it and been taking it but it goes to mail order now, pended in note

## 2025-01-14 NOTE — TELEPHONE ENCOUNTER
Rx Refill Note  Requested Prescriptions     Pending Prescriptions Disp Refills    amLODIPine (NORVASC) 5 MG tablet [Pharmacy Med Name: Amlodipine Besylate Tablet 5 Mg] 90 tablet 1     Sig: TAKE 1 TABLET DAILY      Last office visit with prescribing clinician: 8/27/2024   Last telemedicine visit with prescribing clinician: Visit date not found   Next office visit with prescribing clinician: 3/25/2025       Avril Marin LPN  01/14/25, 10:39 EST    LF-12/12/2023 #90    Basic metabolic panel (09/30/2024 15:29)     NOT FILLED IN OVER A YEAR, PLEASE ADV.

## 2025-01-14 NOTE — TELEPHONE ENCOUNTER
I had in my note that he was still taking the medication. Please call to confirm. If he's been getting it through the mail order, a lot of times I have patients state that they send them a lot.

## 2025-02-25 ENCOUNTER — TELEPHONE (OUTPATIENT)
Dept: FAMILY MEDICINE CLINIC | Age: 50
End: 2025-02-25
Payer: COMMERCIAL

## 2025-02-25 NOTE — TELEPHONE ENCOUNTER
Patient dropped off [place] papers and paid the $20 fee. Papers scanned in and placed in [place]'s mailbox.

## 2025-02-27 ENCOUNTER — OFFICE VISIT (OUTPATIENT)
Dept: FAMILY MEDICINE CLINIC | Age: 50
End: 2025-02-27
Payer: COMMERCIAL

## 2025-02-27 VITALS
HEART RATE: 70 BPM | OXYGEN SATURATION: 98 % | HEIGHT: 70 IN | DIASTOLIC BLOOD PRESSURE: 70 MMHG | WEIGHT: 212.4 LBS | SYSTOLIC BLOOD PRESSURE: 120 MMHG | BODY MASS INDEX: 30.41 KG/M2 | TEMPERATURE: 98.4 F

## 2025-02-27 DIAGNOSIS — I10 ESSENTIAL (PRIMARY) HYPERTENSION: Primary | ICD-10-CM

## 2025-02-27 DIAGNOSIS — M19.072 PRIMARY OSTEOARTHRITIS OF BOTH FEET: ICD-10-CM

## 2025-02-27 DIAGNOSIS — M19.071 PRIMARY OSTEOARTHRITIS OF BOTH FEET: ICD-10-CM

## 2025-02-27 DIAGNOSIS — M48.061 NEURAL FORAMINAL STENOSIS OF LUMBAR SPINE: ICD-10-CM

## 2025-02-27 DIAGNOSIS — M47.816 FACET HYPERTROPHY OF LUMBAR REGION: ICD-10-CM

## 2025-02-27 DIAGNOSIS — E78.2 MIXED HYPERLIPIDEMIA: ICD-10-CM

## 2025-02-27 PROCEDURE — 99214 OFFICE O/P EST MOD 30 MIN: CPT | Performed by: NURSE PRACTITIONER

## 2025-02-27 RX ORDER — ATORVASTATIN CALCIUM 20 MG/1
20 TABLET, FILM COATED ORAL DAILY
Qty: 90 TABLET | Refills: 0 | Status: SHIPPED | OUTPATIENT
Start: 2025-02-27 | End: 2025-02-27

## 2025-02-27 RX ORDER — AMLODIPINE BESYLATE 5 MG/1
5 TABLET ORAL DAILY
Qty: 90 TABLET | Refills: 0 | Status: SHIPPED | OUTPATIENT
Start: 2025-02-27

## 2025-02-27 RX ORDER — ATORVASTATIN CALCIUM 20 MG/1
20 TABLET, FILM COATED ORAL DAILY
Qty: 90 TABLET | Refills: 0 | Status: SHIPPED | OUTPATIENT
Start: 2025-02-27

## 2025-02-27 RX ORDER — LISINOPRIL AND HYDROCHLOROTHIAZIDE 20; 25 MG/1; MG/1
1 TABLET ORAL DAILY
Qty: 90 TABLET | Refills: 1 | Status: SHIPPED | OUTPATIENT
Start: 2025-02-27

## 2025-02-27 NOTE — PROGRESS NOTES
Chief Complaint  Hypertension (Lindsey from [employer]- 6 month flu, discuss FMLA)    Subjective        Chase Leon presents to [facility] today for       History of Present Illness  The patient is a 49-year-old male who presents to establish care with a new primary care provider, as his previous provider has been off-boarded. He also wishes to discuss FMLA and hypertension.    He has a known history of hypertension, managed with amlodipine 5 mg daily and Lisinopril-HCTZ 20/25 daily.   He is currently on a regimen of atorvastatin 10 mg at bedtime for hypercholesterolemia. He maintains a healthy diet and abstains from fast food consumption. He is a former smoker, having quit several years ago.    He reports experiencing severe swelling in his ankle, accompanied by a large protrusion on his big toe, which he suspects may be indicative of arthritis, according to his recent x-rays.  Over the past 5 months, he has noticed a progression of these symptoms to his left ankle, knees, hips, and lower back. He continues to seek chiropractic treatment for his back issues. He has not undergone any MRIs since 2021.    Chronic low back issues, Neural foraminal stenosis of lumbar spine  and facet atrophy , managed by Pain clinic on [facility] and needs his FMLA renewed     SOCIAL HISTORY  The patient is a previous smoker but does not smoke currently.    MEDICATIONS  Current: lisinopril HCTZ, atorvastatin, cyclobenzaprine, tramadol  Discontinued: Lexapro, Voltaren          Current Outpatient Medications:   •  amLODIPine (NORVASC) 5 MG tablet, Take 1 tablet by mouth Daily., Disp: 90 tablet, Rfl: 0  •  atorvastatin (LIPITOR) 20 MG tablet, Take 1 tablet by mouth Daily., Disp: 90 tablet, Rfl: 0  •  cyclobenzaprine (FLEXERIL) 10 MG tablet, Take 1 tablet by mouth Daily As Needed., Disp: , Rfl:   •  lisinopril-hydrochlorothiazide (PRINZIDE,ZESTORETIC) 20-25 MG per tablet, Take 1 tablet by mouth Daily., Disp: 90  "tablet, Rfl: 1  •  traMADol (ULTRAM) 50 MG tablet, Take 1 tablet by mouth Every 8 (Eight) Hours As Needed., Disp: , Rfl:   Medications Discontinued During This Encounter   Medication Reason   • diclofenac (VOLTAREN) 75 MG EC tablet *Therapy completed   • amLODIPine (NORVASC) 5 MG tablet *Therapy completed   • atorvastatin (LIPITOR) 10 MG tablet Reorder   • atorvastatin (LIPITOR) 20 MG tablet    • lisinopril-hydrochlorothiazide (PRINZIDE,ZESTORETIC) 20-25 MG per tablet Reorder         Allergies:  Patient has no known allergies.      Objective   Vital Signs:   Vitals:    02/27/25 1426 02/27/25 1506   BP: 143/90 120/70   BP Location: Right arm Right arm   Patient Position: Sitting    Cuff Size: Adult    Pulse: 70    Temp: 98.4 °F (36.9 °C)    TempSrc: Oral    SpO2: 98%    Weight: 96.3 kg (212 lb 6.4 oz)    Height: 177.8 cm (70\")      Body mass index is 30.48 kg/m².           Physical Exam  Constitutional:       Appearance: Normal appearance.   Neck:      Vascular: No carotid bruit.   Cardiovascular:      Rate and Rhythm: Normal rate and regular rhythm.      Heart sounds: Normal heart sounds.   Pulmonary:      Effort: Pulmonary effort is normal.      Breath sounds: Normal breath sounds.   Musculoskeletal:         General: Normal range of motion.      Comments: Chronic low back tenderness    Skin:     General: Skin is warm and dry.   Neurological:      General: No focal deficit present.      Mental Status: He is alert.   Psychiatric:         Mood and Affect: Mood normal.         Behavior: Behavior normal.           Lab Results   Component Value Date    GLUCOSE 79 09/30/2024    BUN 18 09/30/2024    CREATININE 1.05 09/30/2024    EGFRIFNONA 74 10/11/2021    BCR 17.1 09/30/2024    K 4.1 09/30/2024    CO2 27.0 09/30/2024    CALCIUM 9.5 09/30/2024    ALBUMIN 5.1 09/12/2024    AST 22 09/12/2024    ALT 30 09/12/2024       Lab Results   Component Value Date    CHOL 197 09/12/2024    TRIG 129 09/12/2024    HDL 64 (H) 09/12/2024    "  (H) 09/12/2024       Lab Results   Component Value Date    WBC 5.62 10/25/2024    HGB 14.8 10/25/2024    HCT 44.7 10/25/2024    MCV 91.4 10/25/2024     10/25/2024                     Procedures         Diagnoses and all orders for this visit:    1. Essential (primary) hypertension (Primary)  -     lisinopril-hydrochlorothiazide (PRINZIDE,ZESTORETIC) 20-25 MG per tablet; Take 1 tablet by mouth Daily.  Dispense: 90 tablet; Refill: 1  -     amLODIPine (NORVASC) 5 MG tablet; Take 1 tablet by mouth Daily.  Dispense: 90 tablet; Refill: 0    2. Mixed hyperlipidemia  -     Discontinue: atorvastatin (LIPITOR) 20 MG tablet; Take 1 tablet by mouth Daily.  Dispense: 90 tablet; Refill: 0  -     atorvastatin (LIPITOR) 20 MG tablet; Take 1 tablet by mouth Daily.  Dispense: 90 tablet; Refill: 0    3. Neural foraminal stenosis of lumbar spine  Comments:  FMLA x 6 months, managed by pain clinic    4. Facet hypertrophy of lumbar region  Comments:  Renewal of FMLA x 6 months, pain is managed by pain clinic    5. Primary osteoarthritis of both feet  Comments:  Will refer to podiatry for possible injections  Orders:  -     Ambulatory Referral to Podiatry          Assessment & Plan  1. Hypertension.  His blood pressure was slightly elevated today at 143/90, but it has not been significantly high in the past. He will continue his current medication regimen, including lisinopril HCTZ 20-25 mg daily and amlodipine 5 mg daily. Repeat 120/70     2. Hypercholesterolemia.  His LDL cholesterol level was above the target at 110 during the last check on 09/12/2024. The dosage of atorvastatin will be increased to 20 mg nightly. He is advised to take two 10 mg tablets if he has them available to reach the new dosage.    3. Arthritis.  He has arthritis in his feet, which is causing significant discomfort. A referral to podiatry will be made for potential foot injections to alleviate pain. He is also advised to continue seeing his  chiropractor for adjustments, which have previously provided some relief.              Follow Up  Return in about 7 months (around 9/23/2025), or if symptoms worsen or fail to improve, for Annual physical.  Patient was given instructions and counseling regarding his condition or for health maintenance advice. Please see specific information pulled into the AVS if appropriate.           MICHAEL Anaya  02/27/2025    Please note that portions of this document were completed using a voice recognition program.     Patient or patient representative verbalized consent for the use of Ambient Listening during the visit with  MICHAEL Anaya for chart documentation. 2/27/2025  14:50 EST

## 2025-02-27 NOTE — TELEPHONE ENCOUNTER
Patient established care with Aga RODRIGUEZ, at appt today PCP okay to fill out FMLA.     Completed and faxed, patient notified.  Copy left at . Jamin

## 2025-07-09 DIAGNOSIS — I10 ESSENTIAL (PRIMARY) HYPERTENSION: ICD-10-CM

## 2025-07-09 NOTE — TELEPHONE ENCOUNTER
Caller: LeonChase    Relationship: Self    Best call back number: [phone]     Requested Prescriptions:   Requested Prescriptions     Pending Prescriptions Disp Refills    amLODIPine (NORVASC) 5 MG tablet 90 tablet 0     Sig: Take 1 tablet by mouth Daily.        [facility], [address] [phone] [facility] [phone] [facility]     Last office visit with prescribing clinician: 2/27/2025   Last telemedicine visit with prescribing clinician: Visit date not found   Next office visit with prescribing clinician: 8/25/2025     Does the patient have less than a 3 day supply:  [] Yes  [x] No    Ronda Cervantes Rep   07/09/25 16:53 EDT

## 2025-07-10 RX ORDER — AMLODIPINE BESYLATE 5 MG/1
5 TABLET ORAL DAILY
Qty: 90 TABLET | Refills: 0 | Status: SHIPPED | OUTPATIENT
Start: 2025-07-10

## 2025-08-25 ENCOUNTER — OFFICE VISIT (OUTPATIENT)
Dept: FAMILY MEDICINE CLINIC | Age: 50
End: 2025-08-25
Payer: COMMERCIAL

## 2025-08-25 ENCOUNTER — TELEPHONE (OUTPATIENT)
Dept: FAMILY MEDICINE CLINIC | Age: 50
End: 2025-08-25

## 2025-08-25 VITALS
SYSTOLIC BLOOD PRESSURE: 124 MMHG | HEART RATE: 82 BPM | WEIGHT: 205 LBS | DIASTOLIC BLOOD PRESSURE: 86 MMHG | OXYGEN SATURATION: 96 % | TEMPERATURE: 97.9 F | HEIGHT: 70 IN | BODY MASS INDEX: 29.35 KG/M2

## 2025-08-25 DIAGNOSIS — B35.3 TINEA PEDIS OF BOTH FEET: ICD-10-CM

## 2025-08-25 DIAGNOSIS — R73.03 PREDIABETES: ICD-10-CM

## 2025-08-25 DIAGNOSIS — M48.061 NEURAL FORAMINAL STENOSIS OF LUMBAR SPINE: ICD-10-CM

## 2025-08-25 DIAGNOSIS — E78.2 MIXED HYPERLIPIDEMIA: ICD-10-CM

## 2025-08-25 DIAGNOSIS — I10 ESSENTIAL (PRIMARY) HYPERTENSION: ICD-10-CM

## 2025-08-25 DIAGNOSIS — Z00.00 ANNUAL PHYSICAL EXAM: Primary | ICD-10-CM

## 2025-08-25 PROCEDURE — 99396 PREV VISIT EST AGE 40-64: CPT | Performed by: NURSE PRACTITIONER

## 2025-08-25 RX ORDER — ATORVASTATIN CALCIUM 20 MG/1
20 TABLET, FILM COATED ORAL DAILY
Qty: 90 TABLET | Refills: 1 | Status: SHIPPED | OUTPATIENT
Start: 2025-08-25

## 2025-08-25 RX ORDER — LISINOPRIL AND HYDROCHLOROTHIAZIDE 20; 25 MG/1; MG/1
1 TABLET ORAL DAILY
Qty: 90 TABLET | Refills: 1 | Status: SHIPPED | OUTPATIENT
Start: 2025-08-25

## 2025-08-25 RX ORDER — AMLODIPINE BESYLATE 5 MG/1
5 TABLET ORAL DAILY
Qty: 90 TABLET | Refills: 1 | Status: SHIPPED | OUTPATIENT
Start: 2025-08-25

## 2025-08-26 ENCOUNTER — LAB (OUTPATIENT)
Dept: LAB | Facility: HOSPITAL | Age: 50
End: 2025-08-26
Payer: COMMERCIAL

## 2025-08-26 DIAGNOSIS — E78.2 MIXED HYPERLIPIDEMIA: ICD-10-CM

## 2025-08-26 DIAGNOSIS — R73.03 PREDIABETES: ICD-10-CM

## 2025-08-26 DIAGNOSIS — I10 ESSENTIAL (PRIMARY) HYPERTENSION: ICD-10-CM

## 2025-08-26 LAB
ALBUMIN SERPL-MCNC: 4.7 G/DL (ref 3.5–5.2)
ALBUMIN/GLOB SERPL: 2 G/DL
ALP SERPL-CCNC: 46 U/L (ref 39–117)
ALT SERPL W P-5'-P-CCNC: 23 U/L (ref 1–41)
ANION GAP SERPL CALCULATED.3IONS-SCNC: 15.3 MMOL/L (ref 5–15)
AST SERPL-CCNC: 20 U/L (ref 1–40)
BASOPHILS # BLD AUTO: 0.04 10*3/MM3 (ref 0–0.2)
BASOPHILS NFR BLD AUTO: 0.6 % (ref 0–1.5)
BILIRUB SERPL-MCNC: 0.3 MG/DL (ref 0–1.2)
BUN SERPL-MCNC: 14 MG/DL (ref 6–20)
BUN/CREAT SERPL: 15.7 (ref 7–25)
CALCIUM SPEC-SCNC: 9.5 MG/DL (ref 8.6–10.5)
CHLORIDE SERPL-SCNC: 105 MMOL/L (ref 98–107)
CHOLEST SERPL-MCNC: 153 MG/DL (ref 0–200)
CO2 SERPL-SCNC: 22.7 MMOL/L (ref 22–29)
CREAT SERPL-MCNC: 0.89 MG/DL (ref 0.76–1.27)
DEPRECATED RDW RBC AUTO: 46.2 FL (ref 37–54)
EGFRCR SERPLBLD CKD-EPI 2021: 105.7 ML/MIN/1.73
EOSINOPHIL # BLD AUTO: 0.1 10*3/MM3 (ref 0–0.4)
EOSINOPHIL NFR BLD AUTO: 1.4 % (ref 0.3–6.2)
ERYTHROCYTE [DISTWIDTH] IN BLOOD BY AUTOMATED COUNT: 13.5 % (ref 12.3–15.4)
GLOBULIN UR ELPH-MCNC: 2.4 GM/DL
GLUCOSE SERPL-MCNC: 80 MG/DL (ref 65–99)
HBA1C MFR BLD: 5.3 % (ref 4.8–5.6)
HCT VFR BLD AUTO: 41.7 % (ref 37.5–51)
HDLC SERPL-MCNC: 67 MG/DL (ref 40–60)
HGB BLD-MCNC: 14.1 G/DL (ref 13–17.7)
IMM GRANULOCYTES # BLD AUTO: 0.01 10*3/MM3 (ref 0–0.05)
IMM GRANULOCYTES NFR BLD AUTO: 0.1 % (ref 0–0.5)
LDLC SERPL CALC-MCNC: 74 MG/DL (ref 0–100)
LDLC/HDLC SERPL: 1.12 {RATIO}
LYMPHOCYTES # BLD AUTO: 1.96 10*3/MM3 (ref 0.7–3.1)
LYMPHOCYTES NFR BLD AUTO: 27.2 % (ref 19.6–45.3)
MCH RBC QN AUTO: 30.8 PG (ref 26.6–33)
MCHC RBC AUTO-ENTMCNC: 33.8 G/DL (ref 31.5–35.7)
MCV RBC AUTO: 91 FL (ref 79–97)
MONOCYTES # BLD AUTO: 0.73 10*3/MM3 (ref 0.1–0.9)
MONOCYTES NFR BLD AUTO: 10.1 % (ref 5–12)
NEUTROPHILS NFR BLD AUTO: 4.37 10*3/MM3 (ref 1.7–7)
NEUTROPHILS NFR BLD AUTO: 60.6 % (ref 42.7–76)
PLATELET # BLD AUTO: 298 10*3/MM3 (ref 140–450)
PMV BLD AUTO: 9.4 FL (ref 6–12)
POTASSIUM SERPL-SCNC: 3.7 MMOL/L (ref 3.5–5.2)
PROT SERPL-MCNC: 7.1 G/DL (ref 6–8.5)
RBC # BLD AUTO: 4.58 10*6/MM3 (ref 4.14–5.8)
SODIUM SERPL-SCNC: 143 MMOL/L (ref 136–145)
TRIGL SERPL-MCNC: 56 MG/DL (ref 0–150)
TSH SERPL DL<=0.05 MIU/L-ACNC: 2.71 UIU/ML (ref 0.27–4.2)
VLDLC SERPL-MCNC: 12 MG/DL (ref 5–40)
WBC NRBC COR # BLD AUTO: 7.21 10*3/MM3 (ref 3.4–10.8)

## 2025-08-26 PROCEDURE — 83036 HEMOGLOBIN GLYCOSYLATED A1C: CPT

## 2025-08-26 PROCEDURE — 80061 LIPID PANEL: CPT

## 2025-08-26 PROCEDURE — 80050 GENERAL HEALTH PANEL: CPT

## 2025-08-26 PROCEDURE — 36415 COLL VENOUS BLD VENIPUNCTURE: CPT
